# Patient Record
Sex: FEMALE | Race: WHITE | NOT HISPANIC OR LATINO | Employment: FULL TIME | URBAN - METROPOLITAN AREA
[De-identification: names, ages, dates, MRNs, and addresses within clinical notes are randomized per-mention and may not be internally consistent; named-entity substitution may affect disease eponyms.]

---

## 2017-01-13 ENCOUNTER — OFFICE VISIT (OUTPATIENT)
Dept: OBSTETRICS AND GYNECOLOGY | Facility: CLINIC | Age: 47
End: 2017-01-13

## 2017-01-13 VITALS
SYSTOLIC BLOOD PRESSURE: 120 MMHG | HEIGHT: 63 IN | BODY MASS INDEX: 41.46 KG/M2 | DIASTOLIC BLOOD PRESSURE: 80 MMHG | WEIGHT: 234 LBS

## 2017-01-13 DIAGNOSIS — Z01.419 ENCOUNTER FOR GYNECOLOGICAL EXAMINATION WITHOUT ABNORMAL FINDING: Primary | ICD-10-CM

## 2017-01-13 DIAGNOSIS — Z12.31 ENCOUNTER FOR SCREENING MAMMOGRAM FOR BREAST CANCER: ICD-10-CM

## 2017-01-13 DIAGNOSIS — Z12.4 PAP SMEAR FOR CERVICAL CANCER SCREENING: ICD-10-CM

## 2017-01-13 DIAGNOSIS — Z13.9 SCREENING: ICD-10-CM

## 2017-01-13 LAB
BILIRUB BLD-MCNC: NEGATIVE MG/DL
CLARITY, POC: CLEAR
COLOR UR: YELLOW
GLUCOSE UR STRIP-MCNC: NEGATIVE MG/DL
KETONES UR QL: NEGATIVE
LEUKOCYTE EST, POC: NEGATIVE
NITRITE UR-MCNC: NEGATIVE MG/ML
PH UR: 5 [PH] (ref 5–8)
PROT UR STRIP-MCNC: NEGATIVE MG/DL
RBC # UR STRIP: ABNORMAL /UL
SP GR UR: 1.03 (ref 1–1.03)
UROBILINOGEN UR QL: NORMAL

## 2017-01-13 PROCEDURE — 81002 URINALYSIS NONAUTO W/O SCOPE: CPT | Performed by: OBSTETRICS & GYNECOLOGY

## 2017-01-13 PROCEDURE — 99396 PREV VISIT EST AGE 40-64: CPT | Performed by: OBSTETRICS & GYNECOLOGY

## 2017-01-13 RX ORDER — DICLOFENAC SODIUM 75 MG/1
TABLET, DELAYED RELEASE ORAL
COMMUNITY
Start: 2016-10-29 | End: 2017-01-13

## 2017-01-13 NOTE — MR AVS SNAPSHOT
Teresita Billie   1/13/2017 9:00 AM   Office Visit    Dept Phone:  [phone]   Encounter #:  [id number]    Provider:  Katia Franco MD   Department:  [facility]                Your Full Care Plan              Today's Medication Changes          These changes are accurate as of: 1/13/17  9:43 AM.  If you have any questions, ask your nurse or doctor.               Stop taking medication(s)listed here:     diclofenac 75 MG EC tablet   Commonly known as:  VOLTAREN   Stopped by:  Katia Franco MD                      Your Updated Medication List          This list is accurate as of: 1/13/17  9:43 AM.  Always use your most recent med list.                buPROPion  MG 24 hr tablet   Commonly known as:  WELLBUTRIN XL       CENTRUM ADULTS PO       methocarbamol 500 MG tablet   Commonly known as:  ROBAXIN   2 po Q6h PRN pain       vitamin D 27150 UNITS capsule capsule   Commonly known as:  ERGOCALCIFEROL               We Performed the Following     IgP, Aptima HPV     POC Urinalysis Dipstick       You Were Diagnosed With        Codes Comments    Encounter for gynecological examination without abnormal finding    -  Primary ICD-10-CM: Z01.419  ICD-9-CM: V72.31     Encounter for screening mammogram for breast cancer     ICD-10-CM: Z12.31  ICD-9-CM: V76.12     Screening     ICD-10-CM: Z13.9  ICD-9-CM: V82.9     Pap smear for cervical cancer screening     ICD-10-CM: Z12.4  ICD-9-CM: V76.2       Instructions     None    Patient Instructions History      Upcoming Appointments     Visit Type Date Time Department    OFFICE VISIT 1/13/2017  9:00 AM BRAYDEN DUFFY      RECCY[facility]aron Signup     [facility] [employer] allows you to send messages to your doctor, view your test results, renew your prescriptions, schedule appointments, and more. To sign up, go to [employer] and click on the Sign Up Now link in the New User? box. Enter your  "[employer] Activation Code exactly as it appears below along with the last four digits of your Social Security Number and your Date of Birth () to complete the sign-up process. If you do not sign up before the expiration date, you must request a new code.    [employer] Activation Code: [email or web address]  Expires: 2017 10:39 AM    If you have questions, you can email [email or web address] or call [phone] to talk to our [employer] staff. Remember, [employer] is NOT to be used for urgent needs. For medical emergencies, dial 911.               Other Info from Your Visit           Allergies     Penicillins  Hives      Reason for Visit     Gynecologic Exam Last sceening mammo 11/3/15      Vital Signs     Blood Pressure Height Weight Body Mass Index Smoking Status       120/80 63\" (160 cm) 234 lb (106 kg) 41.45 kg/m2 Never Smoker       Problems and Diagnoses Noted     Encounter for routine gynecological examination    -  Primary    Encounter for screening mammogram for breast cancer        Screening        Pap smear for cervical cancer screening          Results     POC Urinalysis Dipstick      Component Value Standard Range & Units    Color Yellow Yellow, Straw, Dark Yellow, Zeina    Clarity, UA Clear Clear    Glucose, UA Negative Negative, 1000 mg/dL (3+) mg/dL    Bilirubin Negative Negative    Ketones, UA Negative Negative    Specific Gravity  1.030 1.005 - 1.030    Blood, UA Trace Negative    pH, Urine 5.0 5.0 - 8.0    Protein, POC Negative Negative mg/dL    Urobilinogen, UA Normal Normal    Leukocytes Negative Negative    Nitrite, UA Negative Negative                    "

## 2017-01-13 NOTE — PROGRESS NOTES
"[facility]    GYN ANNUAL EXAM:  Chief Complaint   Patient presents with   • Gynecologic Exam     Last sceening mammo 11/3/15       Subjective   History of Present Illness    Teresita Wise is a 47 y.o. female who presents for annual exam.  Teresita is having some shoulder pain.  She had laparoscopic shoulder surgery in September.  She also had a left breast ultrasound for a palpable lump in September which was negative.  The lump is no longer present.  No gynecologic issues.    Obstetric History:  OB History      Para Term  AB TAB SAB Ectopic Multiple Living    4 3 3  1  1   3         Menstrual History:  Menarche age: 12 years  No LMP recorded. Patient has had a hysterectomy.  Period Cycle (Days): 28  Period Duration (Days): 6  Period Pattern: Regular  Menstrual Flow: Heavy  Menstrual Control: Maxi pad    Sexual History: active         Family history of breast cancer: no  Family history of ovarian cancer: no  Family history of uterine cancer: no  Family History of cervical cancer: no  Family History of colon cancer/colon polyps: no  Regular self breast exam: yes  Current contraception:S/P HYSTERECTOMY  History of abnormal Pap smear: no  Received Gardasil immunization: N/A  DEISI exposure in utero: no  History of abnormal mammogram: no    The following portions of the patient's history were reviewed and updated as appropriate: allergies, current medications, past family history, past medical history, past social history, past surgical history and problem list.    Review of Systems    Pertinent items are noted in HPI.       Objective   Physical Exam    Visit Vitals   • /80   • Ht 63\" (160 cm)   • Wt 234 lb (106 kg)   • BMI 41.45 kg/m2       General:   alert, appears stated age and cooperative   Heart: regular rate and rhythm, S1, S2 normal, no murmur, click, rub or gallop   Lungs: clear to auscultation bilaterally   Abdomen: soft, non-tender, without masses or " organomegaly   Breast: inspection negative, no nipple discharge or bleeding, no masses or nodularity palpable   Vulva: normal   Vagina: normal mucosa, normal discharge   Cervix: absent   Uterus: absent   Adnexa: no mass, fullness, tenderness     Assessment/Plan   Teresita was seen today for gynecologic exam.    Diagnoses and all orders for this visit:    Encounter for gynecological examination without abnormal finding    Encounter for screening mammogram for breast cancer  -     Mammo Screening Bilateral With CAD; Future    Pap smear for cervical cancer screening  -     IgP, Aptima HPV    Screening  -     POC Urinalysis Dipstick        Thin prep Pap smear.  Mammogram.  Contraception: S/P HYSTERECTOMY.  All questions answered.  Await pap smear results.  Follow up in 1 year.               Katia Franco MD,  FACOG

## 2017-01-17 LAB
CYTOLOGIST CVX/VAG CYTO: NORMAL
CYTOLOGY CVX/VAG DOC THIN PREP: NORMAL
DX ICD CODE: NORMAL
HIV 1 & 2 AB SER-IMP: NORMAL
HPV I/H RISK 4 DNA CVX QL PROBE+SIG AMP: NEGATIVE
OTHER STN SPEC: NORMAL
PATH REPORT.FINAL DX SPEC: NORMAL
STAT OF ADQ CVX/VAG CYTO-IMP: NORMAL

## 2017-01-19 ENCOUNTER — TELEPHONE (OUTPATIENT)
Dept: OBSTETRICS AND GYNECOLOGY | Facility: CLINIC | Age: 47
End: 2017-01-19

## 2017-01-19 NOTE — TELEPHONE ENCOUNTER
----- Message from Katia Franco MD sent at 1/18/2017  2:07 PM EST -----  Congratulations your pap testing is normal      Thank you  Katia Franco MD

## 2017-05-09 ENCOUNTER — TELEPHONE (OUTPATIENT)
Dept: OBSTETRICS AND GYNECOLOGY | Facility: CLINIC | Age: 47
End: 2017-05-09

## 2017-08-01 ENCOUNTER — OFFICE VISIT (OUTPATIENT)
Dept: OBSTETRICS AND GYNECOLOGY | Facility: CLINIC | Age: 47
End: 2017-08-01

## 2017-08-01 VITALS
HEART RATE: 83 BPM | HEIGHT: 63 IN | BODY MASS INDEX: 38.62 KG/M2 | SYSTOLIC BLOOD PRESSURE: 129 MMHG | DIASTOLIC BLOOD PRESSURE: 78 MMHG | WEIGHT: 218 LBS

## 2017-08-01 DIAGNOSIS — R61 HYPERHIDROSIS: Primary | ICD-10-CM

## 2017-08-01 PROCEDURE — 99212 OFFICE O/P EST SF 10 MIN: CPT | Performed by: OBSTETRICS & GYNECOLOGY

## 2017-08-01 RX ORDER — TRAMADOL HYDROCHLORIDE 50 MG/1
TABLET ORAL
COMMUNITY
Start: 2017-07-12 | End: 2017-09-17

## 2017-08-01 RX ORDER — HYDROCODONE BITARTRATE AND ACETAMINOPHEN 5; 325 MG/1; MG/1
TABLET ORAL
COMMUNITY
Start: 2017-07-08 | End: 2017-09-17

## 2017-08-01 RX ORDER — CYCLOBENZAPRINE HCL 5 MG
TABLET ORAL
COMMUNITY
Start: 2017-05-21 | End: 2018-03-14

## 2017-08-01 RX ORDER — METOPROLOL SUCCINATE 50 MG/1
TABLET, EXTENDED RELEASE ORAL
COMMUNITY
Start: 2017-07-05 | End: 2022-10-14

## 2017-08-01 NOTE — PROGRESS NOTES
Subjective   Teresita Wise is a 47 y.o. female   CC: Pt here for sweating.   History of Present Illness  Pt here for sweating.  Patient reports a fullness been ongoing for several years.  She reports about the last 2 years having frequent spells of sweating that*mostly in her face and then progressed and was to her chest.  She reports it is very embarrassing for her and causes her a lot of stress.  She states that when she has these spells she does not typically feel hot.  She denies a symptoms of flushing.  She reports only the sweating.  In fact, her  mentioned that she can be wearing heating pad because she is cold and begin to get sweating spells.  She denies any increase in occurrence of these events during the night.  Patient recently saw her primary care physician for this who performed some laboratory testing.  Patient's FSH level was normal at about 5.  Her estradiol level was also normal 164.  Patient is status post hysterectomy about 15 years ago secondary to persistent vaginal bleeding despite endometrial ablation.  Her ovaries were retained.    OB History    Para Term  AB SAB TAB Ectopic Multiple Living   4 3 3  1 1    3      # Outcome Date GA Lbr Andres/2nd Weight Sex Delivery Anes PTL Lv   4 Term 99 40w0d  8 lb 7 oz (3.827 kg) F CS-LTranv Spinal  Y      Birth Comments: Tubal ligation done   3 SAB 1998      Spinal        Complications: Blighted ovum   2 Term 96 38w0d  7.5 oz (0.213 kg) M CS-LTranv Spinal  Y   1 Term 94 40w0d  8 lb 9 oz (3.884 kg) F CS-LTranv Spinal  Y      Complications: Placental abruption        Past Medical History:   Diagnosis Date   • Clotting disorder    • Depression    • DVT (deep venous thrombosis)     postop   • GERD (gastroesophageal reflux disease)    • H/O malignant hyperthermia     Unverified, occurred in childhood, records sought at [facility] but no longer available   • History of panic attacks    •  Menometrorrhagia     Endometrial ablation followed by hysterectomy   • Urinary tract infection      Past Surgical History:   Procedure Laterality Date   • ANKLE ARTHROSCOPY Left     Postoperative DVT, left calf   •  SECTION     •  SECTION PRIMARY      Emergency  for placental abruption   •  SECTION WITH TUBAL  1998   • CHOLECYSTECTOMY     • D&C WITH SUCTION  1996    8 weeks Postpartum subinvolution, Dr. Yue Ruth, [facility]   • D&C WITH SUCTION  1998    Blighted ovum, Dr. Yue Ruth, [facility]   • DILATATION AND CURETTAGE  1994    Dr. Yue Ruth, chronic postpartum bleeding on Depo-Provera   • HEMORRHOIDECTOMY  1998    Thrombosed hemorrhoids   • HYSTEROSCOPY ENDOMETRIAL ABLATION  2000    [place], Dr. Franco, [facility]   • SHOULDER ARTHROSCOPY  2015   • TOTAL ABDOMINAL HYSTERECTOMY      Menometrorrhagia, failed ablation Dr Franco, at [facility]     Family History   Problem Relation Age of Onset   • Adopted: Yes   • No Known Problems Father    • No Known Problems Mother    • No Known Problems Brother    • No Known Problems Sister    • No Known Problems Paternal Grandfather    • No Known Problems Paternal Grandmother    • No Known Problems Maternal Grandmother    • No Known Problems Maternal Grandfather    • No Known Problems Son      Social History     Social History   • Marital status:      Spouse name: N/A   • Number of children: N/A   • Years of education: N/A     Social History Main Topics   • Smoking status: Never Smoker   • Smokeless tobacco: Never Used   • Alcohol use No   • Drug use: No   • Sexual activity: Yes     Partners: Male     Other Topics Concern   • None     Social History Narrative       Current Outpatient Prescriptions:   •  buPROPion XL (WELLBUTRIN XL) 150 MG 24 hr tablet, , Disp: , Rfl:   •  cyclobenzaprine (FLEXERIL) 5 MG tablet, ,  "Disp: , Rfl:   •  HYDROcodone-acetaminophen (NORCO) 5-325 MG per tablet, , Disp: , Rfl:   •  metoprolol succinate XL (TOPROL-XL) 50 MG 24 hr tablet, , Disp: , Rfl:   •  Multiple Vitamins-Minerals (CENTRUM ADULTS PO), Take  by mouth., Disp: , Rfl:   •  traMADol (ULTRAM) 50 MG tablet, , Disp: , Rfl:   •  vitamin D (ERGOCALCIFEROL) 42443 UNITS capsule capsule, , Disp: , Rfl:      Allergies   Allergen Reactions   • Penicillins Hives       Review of Systems  General: No fever or chills  Constitutional: No weight loss or gain, no hair loss  HENT: No headache, no hearing loss, no tinnitus  Eyes: normal vision, no eye pain  Skin: No rashes  Lymph: No swelling  Neuro: No parathesia, no weakness  Psych: Normal though content, no hallucinations, no SI/HI    Objective   Physical Exam  Vitals:    08/01/17 0938   BP: 129/78   Pulse: 83   Weight: 218 lb (98.9 kg)   Height: 63\" (160 cm)   General: No acute distress, awake and oriented ×3  Extremities: No Tenderness, no Homans sign, no lower extremity edema  Psychiatric: Normal judgment and insight, normal affect and mood  Neurologic: Cranial nerves II through XII intact, no gross deficits    Assessment/Plan   Diagnoses and all orders for this visit:    Hyperhidrosis    Overall, patient's symptoms do not sound like they're related to kathia or post menopause.  She has normal FSH and estradiol levels which would go against menopausal hot flashes.  Additionally, it sounds that she only has symptoms of sweating, and not hot flashes or flushing.  We discussed the typical treatment modalities for post and perimenopausal hot flushing including lifestyle changes, soy replacement, venlafaxine, and estrogen replacement therapy.  I do not feel that medical therapy would be indicated for menopausal hot flashes given her symptomatology and normal hormonal levels.  Additionally, the patient has a history of deep venous thrombosis, and I would never recommend estrogen replacement therapy in this " patient because of these risks.  We discussed the potential risks of compounded estrogen replacement therapy as well.  Overall, I feel that she should consider evaluation with possibly dermatology or neurology for evaluation and treatment of what sounds like hyperhidrosis.  Patient should likely start by returning to her primary care physician to consider referral to these other services.  I do not feel that I have anything that I can offer this patient for her hyperhidrosis at this time other than lifestyle changes.  She verbalizes understanding.  She can see me back as needed in the future.

## 2017-09-29 ENCOUNTER — TELEPHONE (OUTPATIENT)
Dept: OBSTETRICS AND GYNECOLOGY | Facility: CLINIC | Age: 47
End: 2017-09-29

## 2017-09-29 NOTE — TELEPHONE ENCOUNTER
----- Message from Jin Saleem MD sent at 9/28/2017  9:16 PM EDT -----      ----- Message -----     From: Vicki L Severs, RegSch Rep     Sent: 9/28/2017   3:40 PM       To: Jin Saleem MD    [facility] 9/28/17

## 2017-10-02 ENCOUNTER — TELEPHONE (OUTPATIENT)
Dept: OBSTETRICS AND GYNECOLOGY | Facility: CLINIC | Age: 47
End: 2017-10-02

## 2017-10-02 NOTE — TELEPHONE ENCOUNTER
----- Message from Jin Saleem MD sent at 9/28/2017  9:16 PM EDT -----      ----- Message -----     From: Vicki L Severs, RegSch Rep     Sent: 9/28/2017   3:40 PM       To: Jin Saleem MD    [facility] 9/28/17

## 2018-04-18 ENCOUNTER — OFFICE VISIT (OUTPATIENT)
Dept: OBSTETRICS AND GYNECOLOGY | Age: 48
End: 2018-04-18

## 2018-04-18 VITALS
BODY MASS INDEX: 36.19 KG/M2 | HEIGHT: 64 IN | WEIGHT: 212 LBS | SYSTOLIC BLOOD PRESSURE: 110 MMHG | DIASTOLIC BLOOD PRESSURE: 70 MMHG

## 2018-04-18 DIAGNOSIS — Z01.419 WELL WOMAN EXAM WITH ROUTINE GYNECOLOGICAL EXAM: ICD-10-CM

## 2018-04-18 DIAGNOSIS — R30.9 PAINFUL URINATION: Primary | ICD-10-CM

## 2018-04-18 LAB
BILIRUB BLD-MCNC: NEGATIVE MG/DL
CLARITY, POC: CLEAR
COLOR UR: YELLOW
GLUCOSE UR STRIP-MCNC: NEGATIVE MG/DL
KETONES UR QL: NORMAL
LEUKOCYTE EST, POC: NEGATIVE
NITRITE UR-MCNC: NEGATIVE MG/ML
PH UR: 5 [PH] (ref 5–8)
PROT UR STRIP-MCNC: NORMAL MG/DL
RBC # UR STRIP: NEGATIVE /UL
SP GR UR: 1.03 (ref 1–1.03)
UROBILINOGEN UR QL: NORMAL

## 2018-04-18 PROCEDURE — 99396 PREV VISIT EST AGE 40-64: CPT | Performed by: NURSE PRACTITIONER

## 2018-04-18 PROCEDURE — 81002 URINALYSIS NONAUTO W/O SCOPE: CPT | Performed by: NURSE PRACTITIONER

## 2018-04-18 NOTE — PROGRESS NOTES
[facility]  Routine Annual Visit    2018    Patient: Teresita Wise          MR#:[id number]      History of Present Illness    48 y.o. female  who presents for annual exam. Last annual with Dr. Franco 2017. Pap negative, HPV negative. Teresita reports last mammogram normal 2016- results requested.  No GYN complaints today. Seeing dermatology for hyperhidrosis.      No LMP recorded (lmp unknown). Patient has had a hysterectomy.  Obstetric History:  OB History      Para Term  AB Living    4 3 3  1 3    SAB TAB Ectopic Molar Multiple Live Births    1     3         Menstrual History:     No LMP recorded (lmp unknown). Patient has had a hysterectomy.       Sexual History:       ________________________________________  Patient Active Problem List   Diagnosis   • Vaginal itching   • Hyperhidrosis       Past Medical History:   Diagnosis Date   • Clotting disorder    • Depression    • DVT (deep venous thrombosis)     postop   • GERD (gastroesophageal reflux disease)    • H/O malignant hyperthermia     Unverified, occurred in childhood, records sought at [facility] but no longer available   • History of panic attacks    • Menometrorrhagia     Endometrial ablation followed by hysterectomy   • Urinary tract infection        Past Surgical History:   Procedure Laterality Date   • ANKLE ARTHROSCOPY Left     Postoperative DVT, left calf   •  SECTION     •  SECTION PRIMARY      Emergency  for placental abruption   •  SECTION WITH TUBAL  1998   • CHOLECYSTECTOMY     • D&C WITH SUCTION  1996    8 weeks Postpartum subinvolution, Dr. Yue Ruth, [facility]   • D&C WITH SUCTION  1998    Blighted ovum, Dr. Yue Ruth, [facility]   • DILATATION AND CURETTAGE  1994    Dr. Yue Ruth, chronic postpartum bleeding on Depo-Provera   • HEMORRHOIDECTOMY  1998     Thrombosed hemorrhoids   • HYSTEROSCOPY ENDOMETRIAL ABLATION  09/2000    Dr. Joan Haley, [facility]   • SHOULDER ARTHROSCOPY  09/2015   • TOTAL ABDOMINAL HYSTERECTOMY  2002    Menometrorrhagia, failed ablation Dr Franco, at [facility]       History   Smoking Status   • Never Smoker   Smokeless Tobacco   • Never Used       Family History   Problem Relation Age of Onset   • Adopted: Yes   • No Known Problems Father    • No Known Problems Mother    • No Known Problems Brother    • No Known Problems Sister    • No Known Problems Paternal Grandfather    • No Known Problems Paternal Grandmother    • No Known Problems Maternal Grandmother    • No Known Problems Maternal Grandfather    • No Known Problems Son    • Breast cancer Neg Hx    • Ovarian cancer Neg Hx    • Uterine cancer Neg Hx    • Colon cancer Neg Hx    • Melanoma Neg Hx        Prior to Admission medications    Medication Sig Start Date End Date Taking? Authorizing Provider   Glycopyrrolate (ROBINUL PO) Take  by mouth.   Yes Nurse [facility], RN   metoprolol succinate XL (TOPROL-XL) 50 MG 24 hr tablet  7/5/17  Yes Historical Provider, MD   Multiple Vitamins-Minerals (CENTRUM ADULTS PO) Take  by mouth.   Yes Nurse [facility], RN   vitamin D (ERGOCALCIFEROL) 40883 UNITS capsule capsule  9/8/16  Yes Historical Provider, MD   predniSONE (DELTASONE) 20 MG tablet 3 po qd x 3d, then 2 po qd x 3d, then 1 po qd x 3d 3/14/18 4/18/18  Mary Stanley, ULISES     ________________________________________    The following portions of the patient's history were reviewed and updated as appropriate: allergies, current medications, past family history, past medical history, past social history, past surgical history and problem list.    Review of Systems   Constitutional: Negative.    HENT: Negative.    Eyes: Negative for visual disturbance.   Respiratory: Negative for cough, shortness of breath and wheezing.    Cardiovascular:  "Negative for chest pain, palpitations and leg swelling.   Gastrointestinal: Negative for abdominal distention, abdominal pain, blood in stool, constipation, diarrhea, nausea and vomiting.   Endocrine: Negative for cold intolerance and heat intolerance.   Genitourinary: Negative for difficulty urinating, dyspareunia, dysuria, frequency, genital sores, hematuria, menstrual problem, pelvic pain, urgency, vaginal bleeding, vaginal discharge and vaginal pain.   Musculoskeletal: Negative.    Skin: Negative.    Neurological: Negative for dizziness, weakness, light-headedness, numbness and headaches.   Hematological: Negative.    Psychiatric/Behavioral: Negative.    Breasts: negative for lumps skin changes, dimpling, swelling, nipple changes/discharge bilaterally         Objective   Physical Exam    /70   Ht 162.6 cm (64\")   Wt 96.2 kg (212 lb)   LMP  (LMP Unknown)   BMI 36.39 kg/m²    BP Readings from Last 3 Encounters:   04/18/18 110/70   03/14/18 151/87   01/20/18 112/56      Wt Readings from Last 3 Encounters:   04/18/18 96.2 kg (212 lb)   03/14/18 90.7 kg (200 lb)   01/20/18 93.4 kg (206 lb)        BMI: Estimated body mass index is 36.39 kg/m² as calculated from the following:    Height as of this encounter: 162.6 cm (64\").    Weight as of this encounter: 96.2 kg (212 lb).        General:   alert, appears stated age and cooperative   Heart: regular rate and rhythm, S1, S2 normal, no murmur, click, rub or gallop   Lungs: clear to auscultation bilaterally   Abdomen: soft, non-tender, without masses or organomegaly   Breast: inspection negative, no nipple discharge or bleeding, no masses or nodularity palpable   Vulva: normal   Vagina: normal mucosa, normal discharge   Cervix: absent   Uterus: absent    Adnexa: exam limited by habitus     Assessment:    1. Normal annual exam   2. Healthy lifestyle modifications discussed, counseled on self breast exams and bone health      Plan:    []  Rx:   []  Mammogram " request made  [x]  PAP done  []  Occult fecal blood test (Insure)  []  Labs:   []  GC/Chl/TV  []  DEXA scan   []  Referral for colonoscopy:           ULISES Evangelista  4/18/2018 2:39 PM

## 2018-04-24 ENCOUNTER — TELEPHONE (OUTPATIENT)
Dept: OBSTETRICS AND GYNECOLOGY | Age: 48
End: 2018-04-24

## 2018-04-24 LAB
CONV .: NORMAL
CYTOLOGIST CVX/VAG CYTO: NORMAL
CYTOLOGY CVX/VAG DOC THIN PREP: NORMAL
DX ICD CODE: NORMAL
HIV 1 & 2 AB SER-IMP: NORMAL
Lab: NORMAL
OTHER STN SPEC: NORMAL
PATH REPORT.FINAL DX SPEC: NORMAL
STAT OF ADQ CVX/VAG CYTO-IMP: NORMAL

## 2018-04-24 NOTE — TELEPHONE ENCOUNTER
----- Message from ULISES Mesa sent at 4/24/2018  2:37 PM EDT -----  Please inform patient her pap smear returned negative (normal). Thank you.

## 2018-10-02 ENCOUNTER — TELEPHONE (OUTPATIENT)
Dept: OBSTETRICS AND GYNECOLOGY | Age: 48
End: 2018-10-02

## 2018-10-02 NOTE — TELEPHONE ENCOUNTER
----- Message from Jin Saleem MD sent at 10/2/2018  8:10 AM EDT -----  Call patient: Normal mammogram

## 2022-03-10 ENCOUNTER — TRANSCRIBE ORDERS (OUTPATIENT)
Dept: ADMINISTRATIVE | Facility: HOSPITAL | Age: 52
End: 2022-03-10

## 2022-03-10 ENCOUNTER — OFFICE VISIT (OUTPATIENT)
Dept: GASTROENTEROLOGY | Facility: CLINIC | Age: 52
End: 2022-03-10

## 2022-03-10 ENCOUNTER — TELEPHONE (OUTPATIENT)
Dept: GASTROENTEROLOGY | Facility: CLINIC | Age: 52
End: 2022-03-10

## 2022-03-10 VITALS — BODY MASS INDEX: 30.48 KG/M2 | HEIGHT: 63 IN | WEIGHT: 172 LBS | TEMPERATURE: 96.7 F

## 2022-03-10 DIAGNOSIS — R12 HEARTBURN: ICD-10-CM

## 2022-03-10 DIAGNOSIS — Z87.11 HISTORY OF PEPTIC ULCER DISEASE: ICD-10-CM

## 2022-03-10 DIAGNOSIS — R10.13 EPIGASTRIC PAIN: Primary | ICD-10-CM

## 2022-03-10 DIAGNOSIS — Z01.818 OTHER SPECIFIED PRE-OPERATIVE EXAMINATION: Primary | ICD-10-CM

## 2022-03-10 PROCEDURE — 99214 OFFICE O/P EST MOD 30 MIN: CPT | Performed by: NURSE PRACTITIONER

## 2022-03-10 RX ORDER — MELOXICAM 15 MG/1
TABLET ORAL
COMMUNITY
Start: 2022-01-18 | End: 2022-10-14

## 2022-03-10 RX ORDER — ESTRADIOL 0.1 MG/G
1 CREAM VAGINAL
COMMUNITY
Start: 2021-11-19 | End: 2022-11-19

## 2022-03-10 RX ORDER — SUCRALFATE ORAL 1 G/10ML
1 SUSPENSION ORAL 4 TIMES DAILY
COMMUNITY
Start: 2022-02-28 | End: 2022-03-14

## 2022-03-10 RX ORDER — ASCORBIC ACID 250 MG
250 TABLET ORAL DAILY
COMMUNITY

## 2022-03-10 RX ORDER — OMEPRAZOLE 40 MG/1
40 CAPSULE, DELAYED RELEASE ORAL
COMMUNITY
Start: 2022-02-28 | End: 2022-04-25 | Stop reason: HOSPADM

## 2022-03-10 RX ORDER — METHOCARBAMOL 750 MG/1
TABLET, FILM COATED ORAL
COMMUNITY
Start: 2022-01-18 | End: 2022-10-14

## 2022-03-10 RX ORDER — SUMATRIPTAN 100 MG/1
100 TABLET, FILM COATED ORAL
COMMUNITY
Start: 2022-01-18

## 2022-03-10 RX ORDER — LANOLIN ALCOHOL/MO/W.PET/CERES
CREAM (GRAM) TOPICAL DAILY
COMMUNITY

## 2022-03-10 NOTE — TELEPHONE ENCOUNTER
spoke with pt scheduled at [facility] on april 25 arrive at 0920 am stephanie jamil---handed pt egd instructional packet

## 2022-03-10 NOTE — PROGRESS NOTES
Chief Complaint   Patient presents with   • Abdominal Pain   • Heartburn       HPI    Teresita Wise is a  52 y.o. female here to establish care as a new patient for complaints of abdominal pain.    This patient will also follow with Dr. Rivera.    Patient reports approximately 1 month of epigastric pain described as an aching sensation that comes and goes with associated heartburn.  She saw her primary care provider for this issue and was started on omeprazole in combination of twice daily dosing Carafate.  Some relief with medication initiation but not resolution.  She has been on these medications for approximately 2 weeks.  Denies over-the-counter NSAID use.  Does occasionally take Mobic for joint pain.    Denies diarrhea, constipation, rectal bleeding.    Negative Cologuard per patient last year.  Normal colonoscopy ?  5 years ago.  Family history unknown as she is adopted.     Recent lab work in care everywhere ([facility]) with normal hemogram and normal LFTs.    Past Medical History:   Diagnosis Date   • Clotting disorder ([facility])    • Depression    • DVT (deep venous thrombosis) ([facility])     postop   • GERD (gastroesophageal reflux disease)    • H/O malignant hyperthermia     Unverified, occurred in childhood, records sought at [facility] but no longer available   • History of panic attacks    • Menometrorrhagia     Endometrial ablation followed by hysterectomy   • Urinary tract infection        Past Surgical History:   Procedure Laterality Date   • ANKLE ARTHROSCOPY Left     Postoperative DVT, left calf   •  SECTION     •  SECTION PRIMARY      Emergency  for placental abruption   •  SECTION WITH TUBAL  1998   • CHOLECYSTECTOMY     • D & C WITH SUCTION  1996    8 weeks Postpartum subinvolution, Dr. Yue Ruth, [facility]   • D & C WITH SUCTION  1998    Blighted ovum, Dr. Yue Ruth, [facility]   •  DILATATION AND CURETTAGE  04/19/1994    Dr. Yue Ruth, chronic postpartum bleeding on Depo-Provera   • HEMORRHOIDECTOMY  12/11/1998    Thrombosed hemorrhoids   • HYSTEROSCOPY ENDOMETRIAL ABLATION  09/2000    Dr. Joan Haley, [facility]   • SHOULDER ARTHROSCOPY  09/2015   • TOTAL ABDOMINAL HYSTERECTOMY  2002    Menometrorrhagia, failed ablation Dr Franco, at [facility]       Scheduled Meds:     Continuous Infusions: No current facility-administered medications for this visit.      PRN Meds:     Allergies   Allergen Reactions   • Penicillins Hives       Social History     Socioeconomic History   • Marital status:    Tobacco Use   • Smoking status: Never Smoker   • Smokeless tobacco: Never Used   Substance and Sexual Activity   • Alcohol use: No   • Drug use: No   • Sexual activity: Yes     Partners: Male     Birth control/protection: None       Family History   Adopted: Yes   Problem Relation Age of Onset   • No Known Problems Father    • No Known Problems Mother    • No Known Problems Brother    • No Known Problems Sister    • No Known Problems Paternal Grandfather    • No Known Problems Paternal Grandmother    • No Known Problems Maternal Grandmother    • No Known Problems Maternal Grandfather    • No Known Problems Son    • Breast cancer Neg Hx    • Ovarian cancer Neg Hx    • Uterine cancer Neg Hx    • Colon cancer Neg Hx    • Melanoma Neg Hx        Review of Systems   Constitutional: Negative for activity change, appetite change, fatigue, fever and unexpected weight change.   HENT: Negative for trouble swallowing.    Respiratory: Negative for apnea, cough, choking, chest tightness, shortness of breath and wheezing.    Cardiovascular: Negative for chest pain, palpitations and leg swelling.   Gastrointestinal: Positive for abdominal pain. Negative for abdominal distention, anal bleeding, blood in stool, constipation, diarrhea, nausea, rectal pain and vomiting.        Vitals:    03/10/22 0830   Temp: 96.7 °F (35.9 °C)       Physical Exam  Constitutional:       Appearance: She is well-developed.   Abdominal:      General: Bowel sounds are normal. There is no distension.      Palpations: Abdomen is soft. There is no mass.      Tenderness: There is no abdominal tenderness. There is no guarding.      Hernia: No hernia is present.   Skin:     General: Skin is warm and dry.      Capillary Refill: Capillary refill takes less than 2 seconds.   Neurological:      Mental Status: She is alert and oriented to person, place, and time.   Psychiatric:         Behavior: Behavior normal.     Assessment    Diagnoses and all orders for this visit:    1. Epigastric pain (Primary)  -     Case Request; Standing  -     Case Request    2. Heartburn  -     Case Request; Standing  -     Case Request    3. History of peptic ulcer disease  -     Case Request; Standing  -     Case Request       Plan    Miguelina 52-year-old female seen today in follow-up for about 1 month of epigastric pain and heartburn with a history of peptic ulcer disease.    Recommend EGD with Dr. Rivera to further evaluate symptoms rule out recurrence of peptic ulcer, H. pylori infection, hiatal hernia, celiac disease, Nicole's esophagus, etc. Risk/benefits of procedure reviewed with the patient all questions were answered.    Continue omeprazole and Carafate.    Avoid NSAIDs.    Follow an antireflux diet.    Further recommendations and follow-up pending aforementioned work-up.           ULISES Morales  [facility]  [address] [place] [address]  Office: [phone]

## 2022-04-14 ENCOUNTER — TELEPHONE (OUTPATIENT)
Dept: GASTROENTEROLOGY | Facility: CLINIC | Age: 52
End: 2022-04-14

## 2022-04-14 DIAGNOSIS — Z01.818 PRE-OP TESTING: Primary | ICD-10-CM

## 2022-04-22 ENCOUNTER — LAB (OUTPATIENT)
Dept: LAB | Facility: HOSPITAL | Age: 52
End: 2022-04-22

## 2022-04-22 LAB — SARS-COV-2 ORF1AB RESP QL NAA+PROBE: NOT DETECTED

## 2022-04-22 PROCEDURE — U0004 COV-19 TEST NON-CDC HGH THRU: HCPCS | Performed by: INTERNAL MEDICINE

## 2022-04-22 PROCEDURE — C9803 HOPD COVID-19 SPEC COLLECT: HCPCS | Performed by: INTERNAL MEDICINE

## 2022-04-25 ENCOUNTER — HOSPITAL ENCOUNTER (OUTPATIENT)
Facility: HOSPITAL | Age: 52
Setting detail: HOSPITAL OUTPATIENT SURGERY
Discharge: HOME | End: 2022-04-25
Attending: INTERNAL MEDICINE | Admitting: INTERNAL MEDICINE

## 2022-04-25 ENCOUNTER — ANESTHESIA EVENT (OUTPATIENT)
Dept: GASTROENTEROLOGY | Facility: HOSPITAL | Age: 52
End: 2022-04-25

## 2022-04-25 ENCOUNTER — ANESTHESIA (OUTPATIENT)
Dept: GASTROENTEROLOGY | Facility: HOSPITAL | Age: 52
End: 2022-04-25

## 2022-04-25 VITALS
SYSTOLIC BLOOD PRESSURE: 101 MMHG | HEART RATE: 78 BPM | BODY MASS INDEX: 30.05 KG/M2 | HEIGHT: 64 IN | DIASTOLIC BLOOD PRESSURE: 58 MMHG | RESPIRATION RATE: 16 BRPM | WEIGHT: 176 LBS | OXYGEN SATURATION: 98 %

## 2022-04-25 DIAGNOSIS — Z87.11 HISTORY OF PEPTIC ULCER DISEASE: ICD-10-CM

## 2022-04-25 DIAGNOSIS — R10.13 EPIGASTRIC PAIN: ICD-10-CM

## 2022-04-25 DIAGNOSIS — R12 HEARTBURN: ICD-10-CM

## 2022-04-25 PROCEDURE — 25010000002 PROPOFOL 10 MG/ML EMULSION: Performed by: ANESTHESIOLOGY

## 2022-04-25 PROCEDURE — 43239 EGD BIOPSY SINGLE/MULTIPLE: CPT | Performed by: INTERNAL MEDICINE

## 2022-04-25 PROCEDURE — 88305 TISSUE EXAM BY PATHOLOGIST: CPT | Performed by: INTERNAL MEDICINE

## 2022-04-25 PROCEDURE — S0260 H&P FOR SURGERY: HCPCS | Performed by: INTERNAL MEDICINE

## 2022-04-25 RX ORDER — DIPHENHYDRAMINE HYDROCHLORIDE 50 MG/ML
6.25 INJECTION INTRAMUSCULAR; INTRAVENOUS
Status: DISCONTINUED | OUTPATIENT
Start: 2022-04-25 | End: 2022-04-25 | Stop reason: HOSPADM

## 2022-04-25 RX ORDER — EPHEDRINE SULFATE 50 MG/ML
5 INJECTION, SOLUTION INTRAVENOUS ONCE AS NEEDED
Status: DISCONTINUED | OUTPATIENT
Start: 2022-04-25 | End: 2022-04-25 | Stop reason: HOSPADM

## 2022-04-25 RX ORDER — ONDANSETRON 2 MG/ML
4 INJECTION INTRAMUSCULAR; INTRAVENOUS ONCE AS NEEDED
Status: DISCONTINUED | OUTPATIENT
Start: 2022-04-25 | End: 2022-04-25 | Stop reason: HOSPADM

## 2022-04-25 RX ORDER — HYDRALAZINE HYDROCHLORIDE 20 MG/ML
10 INJECTION INTRAMUSCULAR; INTRAVENOUS
Status: DISCONTINUED | OUTPATIENT
Start: 2022-04-25 | End: 2022-04-25 | Stop reason: HOSPADM

## 2022-04-25 RX ORDER — FENTANYL CITRATE 50 UG/ML
25 INJECTION, SOLUTION INTRAMUSCULAR; INTRAVENOUS
Status: DISCONTINUED | OUTPATIENT
Start: 2022-04-25 | End: 2022-04-25 | Stop reason: HOSPADM

## 2022-04-25 RX ORDER — PANTOPRAZOLE SODIUM 40 MG/1
40 TABLET, DELAYED RELEASE ORAL DAILY
Qty: 90 TABLET | Refills: 3 | OUTPATIENT
Start: 2022-04-25 | End: 2022-10-14

## 2022-04-25 RX ORDER — NALOXONE HCL 0.4 MG/ML
0.4 VIAL (ML) INJECTION AS NEEDED
Status: DISCONTINUED | OUTPATIENT
Start: 2022-04-25 | End: 2022-04-25 | Stop reason: HOSPADM

## 2022-04-25 RX ORDER — LABETALOL HYDROCHLORIDE 5 MG/ML
5 INJECTION, SOLUTION INTRAVENOUS
Status: DISCONTINUED | OUTPATIENT
Start: 2022-04-25 | End: 2022-04-25 | Stop reason: HOSPADM

## 2022-04-25 RX ORDER — LIDOCAINE HYDROCHLORIDE 20 MG/ML
INJECTION, SOLUTION INFILTRATION; PERINEURAL AS NEEDED
Status: DISCONTINUED | OUTPATIENT
Start: 2022-04-25 | End: 2022-04-25 | Stop reason: SURG

## 2022-04-25 RX ORDER — PROPOFOL 10 MG/ML
VIAL (ML) INTRAVENOUS AS NEEDED
Status: DISCONTINUED | OUTPATIENT
Start: 2022-04-25 | End: 2022-04-25 | Stop reason: SURG

## 2022-04-25 RX ORDER — SODIUM CHLORIDE, SODIUM LACTATE, POTASSIUM CHLORIDE, CALCIUM CHLORIDE 600; 310; 30; 20 MG/100ML; MG/100ML; MG/100ML; MG/100ML
30 INJECTION, SOLUTION INTRAVENOUS CONTINUOUS PRN
Status: DISCONTINUED | OUTPATIENT
Start: 2022-04-25 | End: 2022-04-25 | Stop reason: HOSPADM

## 2022-04-25 RX ADMIN — PROPOFOL 180 MCG/KG/MIN: 10 INJECTION, EMULSION INTRAVENOUS at 10:38

## 2022-04-25 RX ADMIN — LIDOCAINE HYDROCHLORIDE 60 MG: 20 INJECTION, SOLUTION INFILTRATION; PERINEURAL at 10:38

## 2022-04-25 RX ADMIN — SODIUM CHLORIDE, POTASSIUM CHLORIDE, SODIUM LACTATE AND CALCIUM CHLORIDE 30 ML/HR: 600; 310; 30; 20 INJECTION, SOLUTION INTRAVENOUS at 10:24

## 2022-04-25 RX ADMIN — PROPOFOL 140 MG: 10 INJECTION, EMULSION INTRAVENOUS at 10:38

## 2022-04-25 NOTE — BRIEF OP NOTE
ESOPHAGOGASTRODUODENOSCOPY  Progress Note    Teresita Wise  4/25/2022    Pre-op Diagnosis:   Epigastric pain [R10.13]  Heartburn [R12]  History of peptic ulcer disease [Z87.11]       Post-Op Diagnosis Codes:     * Epigastric pain [R10.13]     * Heartburn [R12]     * History of peptic ulcer disease [Z87.11]     * Gastritis [K29.70]     * Duodenitis [K29.80]    Procedure/CPT® Codes:        Procedure(s):  ESOPHAGOGASTRODUODENOSCOPY with biopsies    Surgeon(s):  Sascha Rivera MD    Anesthesia: Monitored Anesthesia Care    Staff:   Endo Technician: Monty Arce PCT  Endo Nurse: Vesna Lopez RN         Estimated Blood Loss: minimal    Urine Voided: * No values recorded between 4/25/2022 10:33 AM and 4/25/2022 10:44 AM *    Specimens:                Specimens     ID Source Type Tests Collected By Collected At Frozen?    A Gastric, Antrum Tissue · TISSUE PATHOLOGY EXAM   Sascha Rivera MD 4/25/22 1044                 Drains: * No LDAs found *    Findings: EGD with erosive duodenitis.  Antral gastritis and therefore biopsies of the antrum were obtained.  Retroflexed view the GE junction was normal as well as the esophageal mucosa.    Complications: None          Sascha Rivera MD     Date: 4/25/2022  Time: 10:45 EDT

## 2022-04-25 NOTE — H&P
[facility]  Pre Procedure History & Physical    Chief Complaint:   Epigastric pain with history of peptic ulcer disease    Subjective     HPI:   Patient 52-year-old female with history of GERD, DVT and clotting disorder presenting with epigastric pain.  Patient noted with history of peptic ulcer disease here for EGD.    Past Medical History:   Past Medical History:   Diagnosis Date   • Clotting disorder ([facility])    • Depression    • DVT (deep venous thrombosis) ([facility])     postop   • GERD (gastroesophageal reflux disease)    • H/O malignant hyperthermia     Unverified, occurred in childhood, records sought at [facility] but no longer available   • History of panic attacks    • Menometrorrhagia     Endometrial ablation followed by hysterectomy   • Urinary tract infection        Past Surgical History:  Past Surgical History:   Procedure Laterality Date   • ANKLE ARTHROSCOPY Left     Postoperative DVT, left calf   •  SECTION     •  SECTION PRIMARY      Emergency  for placental abruption   •  SECTION WITH TUBAL  1998   • CHOLECYSTECTOMY     • D & C WITH SUCTION  1996    8 weeks Postpartum subinvolution, Dr. Yue Ruth, [facility]   • D & C WITH SUCTION  1998    Blighted ovum, Dr. Yue Ruth, [facility]   • DILATATION AND CURETTAGE  1994    Dr. Yue Ruth, chronic postpartum bleeding on Depo-Provera   • HEMORRHOIDECTOMY  1998    Thrombosed hemorrhoids   • HYSTEROSCOPY ENDOMETRIAL ABLATION  2000    Sudha, Dr. Franco, [facility]   • SHOULDER ARTHROSCOPY  2015   • TOTAL ABDOMINAL HYSTERECTOMY      Menometrorrhagia, failed ablation Dr Franco, at [facility]       Family History:  Family History   Adopted: Yes   Problem Relation Age of Onset   • No Known Problems Father    • No Known Problems Mother    • No Known Problems  "Brother    • No Known Problems Sister    • No Known Problems Paternal Grandfather    • No Known Problems Paternal Grandmother    • No Known Problems Maternal Grandmother    • No Known Problems Maternal Grandfather    • No Known Problems Son    • Breast cancer Neg Hx    • Ovarian cancer Neg Hx    • Uterine cancer Neg Hx    • Colon cancer Neg Hx    • Melanoma Neg Hx        Social History:   reports that she has never smoked. She has never used smokeless tobacco. She reports that she does not drink alcohol and does not use drugs.    Medications:   Medications Prior to Admission   Medication Sig Dispense Refill Last Dose   • ascorbic acid (VITAMIN C) 250 MG tablet Take 250 mg by mouth Daily.   4/24/2022 at Unknown time   • LORazepam (ATIVAN) 1 MG tablet Take 1 mg by mouth.   Past Month at Unknown time   • melatonin 3 MG tablet Take  by mouth Daily.   4/24/2022 at Unknown time   • meloxicam (MOBIC) 15 MG tablet    Past Month at Unknown time   • metoprolol succinate XL (TOPROL-XL) 50 MG 24 hr tablet    4/24/2022 at Unknown time   • MULTIPLE VITAMIN-FOLIC ACID PO Take  by mouth.   4/24/2022 at Unknown time   • ondansetron ODT (ZOFRAN-ODT) 4 MG disintegrating tablet    Past Month at Unknown time   • SUMAtriptan (IMITREX) 100 MG tablet Take 100 mg by mouth.   Past Month at Unknown time   • traZODone (DESYREL) 50 MG tablet    Past Month at Unknown time   • Zinc 50 MG tablet Take  by mouth.   4/24/2022 at Unknown time   • estradiol (ESTRACE) 0.1 MG/GM vaginal cream Insert 1 application into the vagina.   More than a month at Unknown time   • methocarbamol (ROBAXIN) 750 MG tablet    4/11/2022   • omeprazole (priLOSEC) 40 MG capsule Take 40 mg by mouth.   4/11/2022       Allergies:  Penicillins    ROS:    Pertinent items are noted in HPI     Objective     Blood pressure 129/77, pulse 66, resp. rate 12, height 162.6 cm (64\"), weight 79.8 kg (176 lb), SpO2 99 %.    Physical Exam   Constitutional: Pt is oriented to person, place, " and time and well-developed, well-nourished, and in no distress.   Mouth/Throat: Oropharynx is clear and moist.   Neck: Normal range of motion.   Cardiovascular: Normal rate, regular rhythm and normal heart sounds.    Pulmonary/Chest: Effort normal and breath sounds normal.   Abdominal: Soft. Nontender  Skin: Skin is warm and dry.   Psychiatric: Mood, memory, affect and judgment normal.     Assessment/Plan     Diagnosis:  Epigastric pain  History of peptic ulcer disease    Anticipated Surgical Procedure:  EGD    The risks, benefits, and alternatives of this procedure have been discussed with the patient or the responsible party- the patient understands and agrees to proceed.

## 2022-04-25 NOTE — ANESTHESIA PREPROCEDURE EVALUATION
" Anesthesia Evaluation     history of anesthetic complications (h/o MH in childhood): malignant hyperthermia  NPO Solid Status: > 8 hours  NPO Liquid Status: > 2 hours           Airway   Mallampati: II  Neck ROM: full  no difficulty expected  Dental - normal exam     Pulmonary - negative pulmonary ROS and normal exam   (-) COPD, asthma, sleep apnea, not a smoker    PE comment: nonlabored  Cardiovascular - normal exam    Rhythm: regular  Rate: normal    (+) DVT,   (-) hypertension, valvular problems/murmurs, past MI, CAD, dysrhythmias, angina      Neuro/Psych  (+) psychiatric history (panic attacks) Depression,    (-) seizures, TIA, CVA  GI/Hepatic/Renal/Endo    (+) obesity,  GERD, PUD,    (-) liver disease, no renal disease, diabetes, no thyroid disorder    Musculoskeletal (-) negative ROS    Abdominal    Substance History      OB/GYN          Other   blood dyscrasia (\"clotting disorder\"),                   Anesthesia Plan    ASA 2     MAC       Anesthetic plan, all risks, benefits, and alternatives have been provided, discussed and informed consent has been obtained with: patient.        CODE STATUS:       "

## 2022-04-25 NOTE — DISCHARGE INSTRUCTIONS
For the next 24 hours patient needs to be with a responsible adult.    For 24 hours DO NOT drive, operate machinery, appliances, drink alcohol, make important decisions or sign legal documents.    Start with a light or bland diet if you are feeling sick to your stomach otherwise advance to regular diet as tolerated.    Follow recommendations on procedure report if provided by your doctor.    Call Dr Rivera for problems [phone]    Problems may include but not limited to: large amounts of bleeding, trouble breathing, repeated vomiting, severe unrelieved pain, fever or chills.

## 2022-04-25 NOTE — ANESTHESIA POSTPROCEDURE EVALUATION
"Patient: Teresita Wise    Procedure Summary     Date: 04/25/22 Room / Location:  [facility]    Anesthesia Start: 1033 Anesthesia Stop: 1050    Procedure: ESOPHAGOGASTRODUODENOSCOPY with biopsies (N/A Esophagus) Diagnosis:       Epigastric pain      Heartburn      History of peptic ulcer disease      Gastritis      Duodenitis      (Epigastric pain [R10.13])      (Heartburn [R12])      (History of peptic ulcer disease [Z87.11])    Surgeons: Sascha Rivera MD Provider: George Ordoñez MD    Anesthesia Type: MAC ASA Status: 2          Anesthesia Type: MAC    Vitals  Vitals Value Taken Time   BP 98/56 04/25/22 1050   Temp     Pulse 80 04/25/22 1050   Resp 14 04/25/22 1050   SpO2 98 % 04/25/22 1050           Post Anesthesia Care and Evaluation    Patient location during evaluation: bedside  Pain management: adequate  Airway patency: patent  Anesthetic complications: No anesthetic complications    Cardiovascular status: acceptable  Respiratory status: acceptable  Hydration status: acceptable    Comments: *BP 98/56 (BP Location: Left arm, Patient Position: Sitting)   Pulse 80   Resp 14   Ht 162.6 cm (64\")   Wt 79.8 kg (176 lb)   LMP  (LMP Unknown)   SpO2 98%   BMI 30.21 kg/m²         "

## 2022-04-26 LAB
LAB AP CASE REPORT: NORMAL
PATH REPORT.FINAL DX SPEC: NORMAL
PATH REPORT.GROSS SPEC: NORMAL

## 2022-05-11 ENCOUNTER — TELEPHONE (OUTPATIENT)
Dept: GASTROENTEROLOGY | Facility: CLINIC | Age: 52
End: 2022-05-11

## 2022-05-11 NOTE — TELEPHONE ENCOUNTER
Patient called. Advised as per Dr. Rivera's note. She states post procedure she experienced a lot of stomach pain. Reports when she  got up to go to the bathroom, she fainted on the BR floor and sustained a hematoma to her right arm   She states she called our office and someone told her to put a heating pad on her stomach.   There is no documentation she talked to someone in our office.   Patient states she will follow up with her PCP next week and will decide if she wants to move forward with any more testing. States she still has breakthrough pain. Not as bad as it once was but it still continues.   Update to Dr. Rivera and Shen Gomez, practice manager.

## 2022-05-11 NOTE — TELEPHONE ENCOUNTER
----- Message from Sascha Rivear MD sent at 5/11/2022 12:21 PM EDT -----  Biopsies normal, let us know if was still symptomatic for further work-up recommendation.

## 2023-05-15 ENCOUNTER — TELEPHONE (OUTPATIENT)
Dept: GASTROENTEROLOGY | Facility: CLINIC | Age: 53
End: 2023-05-15

## 2023-05-15 NOTE — TELEPHONE ENCOUNTER
Caller: Teresita Wise    Relationship to patient: Self    Best call back number: [phone]    Patient is needing: PATIENT CALLED IN EXPERIENCING SEVERE STOMACH PAINS IN HER UPPER ABDOMEN. SHE WANTS TO KNOW IF SHE CAN BE SEEN BY DR DE TODAY, TOMORROW OR Wednesday. SHE IS IN EXTREME PAIN, WE CAN'T SCHEDULE NEXT DAY AT THE HUB SO PATIENT WOULD LIKE A CALL PLEASE TO SEE IF YOU CAN GET HER IN.

## 2023-05-16 ENCOUNTER — OFFICE VISIT (OUTPATIENT)
Dept: GASTROENTEROLOGY | Facility: CLINIC | Age: 53
End: 2023-05-16
Payer: COMMERCIAL

## 2023-05-16 VITALS
SYSTOLIC BLOOD PRESSURE: 116 MMHG | WEIGHT: 172.2 LBS | HEIGHT: 63 IN | HEART RATE: 56 BPM | BODY MASS INDEX: 30.51 KG/M2 | DIASTOLIC BLOOD PRESSURE: 77 MMHG | TEMPERATURE: 96.9 F

## 2023-05-16 DIAGNOSIS — R10.13 EPIGASTRIC PAIN: ICD-10-CM

## 2023-05-16 DIAGNOSIS — Z87.11 HISTORY OF PEPTIC ULCER DISEASE: Primary | ICD-10-CM

## 2023-05-16 PROCEDURE — 99213 OFFICE O/P EST LOW 20 MIN: CPT | Performed by: INTERNAL MEDICINE

## 2023-05-16 RX ORDER — OMEPRAZOLE/SODIUM BICARBONATE 20MG-1.1G
1 CAPSULE ORAL NIGHTLY
Qty: 90 CAPSULE | Refills: 3 | Status: SHIPPED | OUTPATIENT
Start: 2023-05-16

## 2023-05-16 NOTE — PROGRESS NOTES
Chief Complaint   Patient presents with   • Abdominal Pain       Teresita Wise is a  53 y.o. female here for a follow up visit for peptic ulcer disease.    HPI     Patient 53-year-old female with history of IBS and GERD with recurrent epigastric pain similar to her peptic ulcer's pain she had last year.  Patient was found with duodenal erosions and PPI was changed from omeprazole to pantoprazole.  Patient reports she improved but her symptoms recurred despite ongoing therapy with pantoprazole.  Strangely the pantoprazole is not on her med list and on review it apparently was DC'd last October by the urgent care center she saw for UTI.  Not certain if she actually stopped the medication or was just removed from the list but patient reports she is still taking.    Past Medical History:   Diagnosis Date   • Clotting disorder    • Depression    • DVT (deep venous thrombosis) 2014    postop   • GERD (gastroesophageal reflux disease) 2015   • GI (gastrointestinal bleed) 2016   • H/O malignant hyperthermia 1978    Unverified, occurred in childhood, records sought at [facility] but no longer available   • History of panic attacks 2008   • Hypertension ?   • Irritable bowel syndrome ?   • Menometrorrhagia     Endometrial ablation followed by hysterectomy   • Ulcer 2016   • Urinary tract infection          Current Outpatient Medications:   •  ascorbic acid (VITAMIN C) 250 MG tablet, Take 1 tablet by mouth Daily., Disp: , Rfl:   •  CRANBERRY PO, Take  by mouth., Disp: , Rfl:   •  cyclobenzaprine (FLEXERIL) 10 MG tablet, , Disp: , Rfl:   •  HYDROcodone-acetaminophen (NORCO) 7.5-325 MG per tablet, Take 1 tablet by mouth As Needed., Disp: , Rfl:   •  LORazepam (ATIVAN) 1 MG tablet, Take 1 tablet by mouth., Disp: , Rfl:   •  melatonin 3 MG tablet, Take  by mouth Daily., Disp: , Rfl:   •  metoprolol succinate XL (TOPROL-XL) 25 MG 24 hr tablet, , Disp: , Rfl:   •  MULTIPLE VITAMIN-FOLIC ACID PO, Take  by mouth., Disp:  , Rfl:   •  Omega-3 Fatty Acids (fish oil) 1000 MG capsule capsule, Take  by mouth Daily With Breakfast., Disp: , Rfl:   •  PROBIOTIC, LACTOBACILLUS, PO, Take  by mouth., Disp: , Rfl:   •  SUMAtriptan (IMITREX) 100 MG tablet, Take 1 tablet by mouth., Disp: , Rfl:   •  vitamin B-12 (CYANOCOBALAMIN) 1000 MCG tablet, Take 1 tablet by mouth Daily., Disp: , Rfl:   •  Zinc 50 MG tablet, Take  by mouth., Disp: , Rfl:   •  fexofenadine (Allegra Allergy) 60 MG tablet, Take 1 tablet by mouth Daily for 10 days., Disp: 10 tablet, Rfl: 0  •  fluticasone (FLONASE) 50 MCG/ACT nasal spray, 2 sprays into the nostril(s) as directed by provider Daily for 30 days., Disp: 16 g, Rfl: 0  •  Omeprazole-Sodium Bicarbonate  MG capsule, Take 1 capsule by mouth Every Night., Disp: 90 capsule, Rfl: 3    Allergies   Allergen Reactions   • Penicillins Hives   • Penicillin G Hives       Social History     Socioeconomic History   • Marital status:    Tobacco Use   • Smoking status: Never   • Smokeless tobacco: Never   Vaping Use   • Vaping Use: Never used   Substance and Sexual Activity   • Alcohol use: No   • Drug use: No   • Sexual activity: Yes     Partners: Male     Birth control/protection: None, Hysterectomy       Family History   Adopted: Yes   Problem Relation Age of Onset   • No Known Problems Father    • No Known Problems Mother    • No Known Problems Brother    • No Known Problems Sister    • No Known Problems Paternal Grandfather    • No Known Problems Paternal Grandmother    • No Known Problems Maternal Grandmother    • No Known Problems Maternal Grandfather    • No Known Problems Son    • Breast cancer Neg Hx    • Ovarian cancer Neg Hx    • Uterine cancer Neg Hx    • Colon cancer Neg Hx    • Melanoma Neg Hx        Review of Systems   Constitutional: Negative.    Respiratory: Negative.    Cardiovascular: Negative.    Gastrointestinal: Positive for abdominal pain. Negative for abdominal distention, anal bleeding, blood in  stool, constipation, diarrhea, nausea, rectal pain and vomiting.   Musculoskeletal: Negative.    Skin: Negative.    Hematological: Negative.        Vitals:    05/16/23 1224   BP: 116/77   Pulse: 56   Temp: 96.9 °F (36.1 °C)       Physical Exam  Vitals reviewed.   Constitutional:       Appearance: Normal appearance. She is well-developed.   HENT:      Head: Normocephalic and atraumatic.      Mouth/Throat:      Mouth: Mucous membranes are moist.   Eyes:      General: No scleral icterus.     Pupils: Pupils are equal, round, and reactive to light.   Cardiovascular:      Rate and Rhythm: Normal rate and regular rhythm.      Heart sounds: Normal heart sounds.   Pulmonary:      Effort: Pulmonary effort is normal. No respiratory distress.      Breath sounds: Normal breath sounds. No wheezing or rales.   Abdominal:      General: Bowel sounds are normal. There is no distension.      Palpations: Abdomen is soft.      Tenderness: There is no abdominal tenderness.   Skin:     General: Skin is warm and dry.      Coloration: Skin is not jaundiced.      Findings: No rash.   Neurological:      General: No focal deficit present.      Mental Status: She is alert and oriented to person, place, and time.      Cranial Nerves: No cranial nerve deficit.   Psychiatric:         Mood and Affect: Mood normal.         Behavior: Behavior normal.         Thought Content: Thought content normal.         No visits with results within 2 Month(s) from this visit.   Latest known visit with results is:   Admission on 01/03/2023, Discharged on 01/03/2023   Component Date Value Ref Range Status   • Rapid Influenza A Ag 01/03/2023 Negative   Final-Edited   • Rapid Influenza B Ag 01/03/2023 Negative   Corrected   • Internal Control 01/03/2023 Passed   Final-Edited   • Lot Number 01/03/2023 [id number]   Final-Edited   • Expiration Date 01/03/2023 03-26-24   Final-Edited       Diagnoses and all orders for this visit:    1. History of peptic ulcer disease  (Primary)    2. Epigastric pain    Other orders  -     Omeprazole-Sodium Bicarbonate  MG capsule; Take 1 capsule by mouth Every Night.  Dispense: 90 capsule; Refill: 3      Patient 53-year-old female with history of IBS, dyspepsia and peptic ulcer disease in the past with recurrent epigastric pain.  Patient reports did improve on pantoprazole but symptoms started to worsen again.  Interestingly at last urgent care visit it appears that the nurse practitioner stopped her pantoprazole though patient says she is still taking it.  As she is already failed omeprazole and pantoprazole with breakthrough would recommend trial of Zegerid which is the only other medication patient's insurance will cover.  We will take nightly as recommended and follow clinical response.

## 2023-05-17 ENCOUNTER — TELEPHONE (OUTPATIENT)
Dept: GASTROENTEROLOGY | Facility: CLINIC | Age: 53
End: 2023-05-17
Payer: COMMERCIAL

## 2023-05-17 NOTE — TELEPHONE ENCOUNTER
Caller: Teresita Wise    Relationship: Self    Best call back number: [phone]    What was the call regarding: PT NEEDING A PRIOR AUTHORIZATION FOR Omeprazole-Sodium Bicarbonate  MG capsule [85428] (Order [id number])    Do you require a callback: YES

## 2023-05-18 NOTE — TELEPHONE ENCOUNTER
Prior authorization has been approved. Coverage Starts on: 5/17/2023 12:00:00 AM, Coverage Ends on: 5/16/2024 12:00:00 AM.    Pt pharmacy has been notified.

## 2023-05-31 ENCOUNTER — TELEPHONE (OUTPATIENT)
Dept: GASTROENTEROLOGY | Facility: CLINIC | Age: 53
End: 2023-05-31

## 2023-05-31 RX ORDER — OMEPRAZOLE/SODIUM BICARBONATE 20MG-1.1G
1 CAPSULE ORAL NIGHTLY
Qty: 90 CAPSULE | Refills: 3 | Status: SHIPPED | OUTPATIENT
Start: 2023-05-31

## 2023-05-31 NOTE — TELEPHONE ENCOUNTER
Patient called. [facility] to [facility] Advised for a 90 supply to be mailed to her home would be $14.60. Patient is agreeable and will set up an account with them. Script e-scribed to [facility].

## 2023-05-31 NOTE — TELEPHONE ENCOUNTER
----- Message from Teresita Wise sent at 5/28/2023  8:41 PM EDT -----  Regarding: Prescription   Contact: [phone]  Hi my insurance will not cover the new med that was prescribed. I have called the office and talked to ppl but I guess it never got relayed.   Teresita Wise

## 2023-09-06 ENCOUNTER — TELEPHONE (OUTPATIENT)
Dept: URGENT CARE | Facility: CLINIC | Age: 53
End: 2023-09-06
Payer: COMMERCIAL

## 2023-09-06 NOTE — TELEPHONE ENCOUNTER
Patient called states she has developed a yeast infection from her medication.  Work quested Diflucan.    Diflucan 150 mg #2 prescribed to pharmacy on record.

## 2023-11-02 ENCOUNTER — TELEPHONE (OUTPATIENT)
Dept: GASTROENTEROLOGY | Facility: CLINIC | Age: 53
End: 2023-11-02
Payer: COMMERCIAL

## 2023-11-02 ENCOUNTER — OFFICE VISIT (OUTPATIENT)
Dept: GASTROENTEROLOGY | Facility: CLINIC | Age: 53
End: 2023-11-02
Payer: COMMERCIAL

## 2023-11-02 VITALS
DIASTOLIC BLOOD PRESSURE: 82 MMHG | WEIGHT: 172 LBS | TEMPERATURE: 97.4 F | HEART RATE: 73 BPM | HEIGHT: 64 IN | BODY MASS INDEX: 29.37 KG/M2 | SYSTOLIC BLOOD PRESSURE: 116 MMHG

## 2023-11-02 DIAGNOSIS — K21.9 GASTROESOPHAGEAL REFLUX DISEASE, UNSPECIFIED WHETHER ESOPHAGITIS PRESENT: ICD-10-CM

## 2023-11-02 DIAGNOSIS — K59.00 CONSTIPATION, UNSPECIFIED CONSTIPATION TYPE: Primary | ICD-10-CM

## 2023-11-02 DIAGNOSIS — Z12.11 SCREENING FOR COLON CANCER: ICD-10-CM

## 2023-11-02 RX ORDER — PANTOPRAZOLE SODIUM 40 MG/1
40 TABLET, DELAYED RELEASE ORAL DAILY
COMMUNITY

## 2023-11-02 NOTE — TELEPHONE ENCOUNTER
----- Message from ULISES Morales sent at 11/2/2023 10:17 AM EDT -----  Can we run a screening colonoscopy on her insurance and make sure it is covered?

## 2023-11-02 NOTE — PROGRESS NOTES
Chief Complaint   Patient presents with    Constipation       HPI    Teresita Wise is a  53 y.o. female here for a follow up visit for GERD and constipation.     This patient follows with Dr. Rivera and myself.    She underwent EGD spring 2022 prompted for symptoms of abdominal pain with findings of gastritis and duodenal erosion without bleeding.    On visit today patient reports she is taking over-the-counter pantoprazole.  She has never started Zegerid.  Her primary concern on visit today is abdominal bloating, gas and constipation not reported on prior visits.  This has been an issue for at least 1 year.    She has a bowel movement every 4 days.  Denies rectal bleeding or rectal pain.  She is not taking anything over-the-counter to improve constipation.  Her appetite is good and her weight is stable.    She denies nausea, vomiting, dysphagia or odynophagia.    She reports negative Cologuard 2 to 3 years ago unavailable for review.  Family history is unknown as she is adopted.    She does report considerable psychosocial stress.    Past Medical History:   Diagnosis Date    Clotting disorder     Depression     DVT (deep venous thrombosis)     postop    GERD (gastroesophageal reflux disease)     GI (gastrointestinal bleed)     H/O malignant hyperthermia     Unverified, occurred in childhood, records sought at [facility] but no longer available    History of panic attacks 2008    Hypertension ?    Irritable bowel syndrome ?    Menometrorrhagia     Endometrial ablation followed by hysterectomy    Ulcer 2016    Urinary tract infection        Past Surgical History:   Procedure Laterality Date    ANKLE ARTHROSCOPY Left 2014    Postoperative DVT, left calf     SECTION       SECTION PRIMARY      Emergency  for placental abruption     SECTION WITH TUBAL  1998    CHOLECYSTECTOMY      COLONOSCOPY      D & C WITH SUCTION  1996    8 weeks  Postpartum subinvolution, Dr. Yue Ruth, [facility]    D & C WITH SUCTION  05/13/1998    Blighted ovum, Dr. Yue Ruth, [facility]    DILATATION AND CURETTAGE  04/19/1994    Dr. Yue Ruth, chronic postpartum bleeding on Depo-Provera    ENDOSCOPY N/A 04/25/2022    Procedure: ESOPHAGOGASTRODUODENOSCOPY with biopsies;  Surgeon: Sascha Rivera MD;  Location: [facility] ENDOSCOPY;  Service: Gastroenterology;  Laterality: N/A;  Pre: epigastric pain, h/x of ulcers  Post: errosive duodenitis, gastritis    HEMORRHOIDECTOMY  12/11/1998    Thrombosed hemorrhoids    HYSTEROSCOPY ENDOMETRIAL ABLATION  09/2000    ThermaChoickamlesh, Dr. Franco, [facility]    SHOULDER ARTHROSCOPY  09/2015    TOTAL ABDOMINAL HYSTERECTOMY  2002    Menometrorrhagia, failed ablation Dr Franco, at [facility]    UPPER GASTROINTESTINAL ENDOSCOPY  2022       Scheduled Meds:     Continuous Infusions: No current facility-administered medications for this visit.      PRN Meds:     Allergies   Allergen Reactions    Penicillins Hives    Penicillin G Hives       Social History     Socioeconomic History    Marital status:    Tobacco Use    Smoking status: Never    Smokeless tobacco: Never   Vaping Use    Vaping Use: Never used   Substance and Sexual Activity    Alcohol use: No    Drug use: No    Sexual activity: Yes     Partners: Male     Birth control/protection: None, Hysterectomy       Family History   Adopted: Yes   Problem Relation Age of Onset    No Known Problems Father     No Known Problems Mother     No Known Problems Brother     No Known Problems Sister     No Known Problems Paternal Grandfather     No Known Problems Paternal Grandmother     No Known Problems Maternal Grandmother     No Known Problems Maternal Grandfather     No Known Problems Son     Breast cancer Neg Hx     Ovarian cancer Neg Hx     Uterine cancer Neg Hx     Colon cancer Neg Hx     Melanoma Neg Hx        Review of  Systems   Gastrointestinal:  Positive for constipation.       Vitals:    11/02/23 0935   BP: 116/82   Pulse: 73   Temp: 97.4 °F (36.3 °C)       Physical Exam  Constitutional:       Appearance: She is well-developed.   Abdominal:      General: Bowel sounds are normal. There is no distension.      Palpations: Abdomen is soft. There is no mass.      Tenderness: There is no abdominal tenderness. There is no guarding.      Hernia: No hernia is present.   Skin:     General: Skin is warm and dry.      Capillary Refill: Capillary refill takes less than 2 seconds.   Neurological:      Mental Status: She is alert and oriented to person, place, and time.   Psychiatric:         Behavior: Behavior normal.     Assessment    Diagnoses and all orders for this visit:    1. Constipation, unspecified constipation type (Primary)    2. Gastroesophageal reflux disease, unspecified whether esophagitis present    3. Screening for colon cancer  -     Case Request; Standing  -     Case Request    Other orders  -     linaclotide (Linzess) 72 MCG capsule capsule; Take 1 capsule by mouth Every Morning Before Breakfast for 8 days.  Dispense: 8 capsule; Refill: 0      Plan    Regarding constipation recommend trial of low-dose Linzess to improve bowel pattern and alleviate constipation related symptoms  Patient would like to continue over-the-counter PPI therapy for now and await response to improvement in constipation  We discussed moving forward with a screening colonoscopy which she is agreeable to however she is concerned about high deductible with her insurance.  We will attempt to run out-of-pocket price for her prior to scheduling.         ULISES Morales  [facility]  [address] [place] [address]  Office: [phone]

## 2023-11-03 ENCOUNTER — TELEPHONE (OUTPATIENT)
Dept: GASTROENTEROLOGY | Facility: CLINIC | Age: 53
End: 2023-11-03

## 2023-11-03 ENCOUNTER — TELEPHONE (OUTPATIENT)
Dept: GASTROENTEROLOGY | Facility: CLINIC | Age: 53
End: 2023-11-03
Payer: COMMERCIAL

## 2023-11-03 PROBLEM — Z12.11 SCREENING FOR COLON CANCER: Status: ACTIVE | Noted: 2023-11-02

## 2023-11-03 NOTE — TELEPHONE ENCOUNTER
Hub staff attempted to follow warm transfer process and was unsuccessful     Caller: Teresita Wise    Relationship to patient: Self    Best call back number: [phone]     Patient is needing: PT CALLING TO SCHEDULE COLONOSCOPY. PT PREFERS A MON OR TUES APPT. PT IS OFF WORK THOSE DAYS. PLEASE CALL PT BACK TO SCHEDULE.

## 2023-11-03 NOTE — TELEPHONE ENCOUNTER
Hub staff attempted to follow warm transfer process and was unsuccessful     Caller: Teresita Wise    Relationship to patient: Self    Best call back number: [phone]     Patient is needing: PATIENT NEEDS TO RESCHEDULE THEIR COLONOSCOPY ON 1/22/24 PLEASE CALL BACK.

## 2023-11-06 ENCOUNTER — TELEPHONE (OUTPATIENT)
Dept: GASTROENTEROLOGY | Facility: CLINIC | Age: 53
End: 2023-11-06

## 2023-11-06 NOTE — TELEPHONE ENCOUNTER
Caller: Teresita Wise    Relationship to patient: Self    Best call back number: [phone]     Patient is needing: PATIENT STATES THAT SHE NO LONGER NEEDS TO RESCHEDULE THIS AND WANTS TO KEEP CURRENT PROCEDURE DATE. PLEASE CALL BACK IF NEEDED.

## 2023-11-06 NOTE — TELEPHONE ENCOUNTER
Caller: Teresita Wise     Relationship to patient: Self     Best call back number: [phone]      Patient is needing: PATIENT STATES THAT SHE NO LONGER NEEDS TO RESCHEDULE THIS AND WANTS TO KEEP CURRENT PROCEDURE DATE. PLEASE CALL BACK IF NEEDED.

## 2023-11-06 NOTE — TELEPHONE ENCOUNTER
Caller: Teresita Wise     Relationship to patient: Self     Best call back number: [phone]      Patient is needing: PATIENT STATES THAT SHE NO LONGER NEEDS TO RESCHEDULE THIS AND WANTS TO KEEP CURRENT PROCEDURE DATE. PLEASE CALL BACK IF NEEDED.

## 2023-11-30 ENCOUNTER — TELEPHONE (OUTPATIENT)
Dept: GASTROENTEROLOGY | Facility: CLINIC | Age: 53
End: 2023-11-30
Payer: COMMERCIAL

## 2023-11-30 NOTE — TELEPHONE ENCOUNTER
CALLER: Teresita Wise     RELATIONSHIP TO PATIENT: Self    BEST CALL BACK NUMBER: [phone]    CALL REGARDING: Is here in office to  Linzess samples that was mention on [employer] Message

## 2023-12-11 ENCOUNTER — OFFICE VISIT (OUTPATIENT)
Dept: OBSTETRICS AND GYNECOLOGY | Age: 53
End: 2023-12-11
Payer: COMMERCIAL

## 2023-12-11 VITALS
DIASTOLIC BLOOD PRESSURE: 82 MMHG | SYSTOLIC BLOOD PRESSURE: 128 MMHG | WEIGHT: 174.4 LBS | HEIGHT: 63 IN | BODY MASS INDEX: 30.9 KG/M2

## 2023-12-11 DIAGNOSIS — Z12.31 SCREENING MAMMOGRAM FOR BREAST CANCER: ICD-10-CM

## 2023-12-11 DIAGNOSIS — N39.3 STRESS INCONTINENCE IN FEMALE: ICD-10-CM

## 2023-12-11 DIAGNOSIS — N89.8 ITCHING IN THE VAGINAL AREA: ICD-10-CM

## 2023-12-11 DIAGNOSIS — Z13.89 SCREENING FOR BLOOD OR PROTEIN IN URINE: ICD-10-CM

## 2023-12-11 DIAGNOSIS — K64.0 GRADE I HEMORRHOIDS: ICD-10-CM

## 2023-12-11 DIAGNOSIS — Z01.419 WELL FEMALE EXAM WITH ROUTINE GYNECOLOGICAL EXAM: Primary | ICD-10-CM

## 2023-12-11 LAB
BILIRUB BLD-MCNC: NEGATIVE MG/DL
CLARITY, POC: CLEAR
COLOR UR: YELLOW
GLUCOSE UR STRIP-MCNC: NEGATIVE MG/DL
KETONES UR QL: NEGATIVE
LEUKOCYTE EST, POC: ABNORMAL
NITRITE UR-MCNC: NEGATIVE MG/ML
PH UR: 5 [PH] (ref 5–8)
PROT UR STRIP-MCNC: NEGATIVE MG/DL
RBC # UR STRIP: NEGATIVE /UL
SP GR UR: 1.03 (ref 1–1.03)
UROBILINOGEN UR QL: NORMAL

## 2023-12-11 NOTE — PROGRESS NOTES
"[facility]   Obstetrics and Gynecology     2023    Patient: Teresita Wise          MR#:[id number]    History of Present Illness    Chief Complaint   Patient presents with    Gynecologic Exam     Old/new gyn, annual exam, last pap 18 (-), last mammo 23 (-), urinary incontinence since having bronchitis last week and pt states she thinks she \"broke something loose\"       53 y.o. female  who presents for annual exam. 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 Patient has had a hysterectomy.  Obstetric History:  OB History          4    Para   3    Term   3            AB   1    Living   3         SAB   1    IAB        Ectopic        Molar        Multiple        Live Births   3               Menstrual History:     No LMP recorded (lmp unknown). Patient has had a hysterectomy.       Social History     Substance and Sexual Activity   Sexual Activity Yes    Partners: Male    Birth control/protection: None, Hysterectomy     ______________________________________  Patient Active Problem List   Diagnosis    Vaginal itching    Hyperhidrosis    Epigastric pain    Heartburn    History of peptic ulcer disease    Screening for colon cancer     Past Medical History:   Diagnosis Date    Anxiety     Clotting disorder     Depression     DVT (deep venous thrombosis) 2014    postop    GERD (gastroesophageal reflux disease) 2015    Gestational hypertension     GI (gastrointestinal bleed) 2016    H/O malignant hyperthermia     Unverified, occurred in childhood, records sought at [facility] but no longer available    History of panic attacks 2008    Hypertension ?    Irritable bowel syndrome ?    Kidney stone     " Menometrorrhagia     Endometrial ablation followed by hysterectomy    Multiple gestation     Recurrent pregnancy loss, antepartum condition or complication     Ulcer 2016    Urinary tract infection     Varicella      Past Surgical History:   Procedure Laterality Date    ANKLE ARTHROSCOPY Left 2014    Postoperative DVT, left calf     SECTION       SECTION PRIMARY      Emergency  for placental abruption     SECTION WITH TUBAL  1998    CHOLECYSTECTOMY  2002    COLONOSCOPY      D & C WITH SUCTION  1996    8 weeks Postpartum subinvolution, Dr. Yue Ruth, [facility]    D & C WITH SUCTION  1998    Blighted ovum, Dr. Yue Ruth, [facility]    DILATATION AND CURETTAGE  1994    Dr. Yue Ruth, chronic postpartum bleeding on Depo-Provera    ENDOSCOPY N/A 2022    Procedure: ESOPHAGOGASTRODUODENOSCOPY with biopsies;  Surgeon: Sascha Rivera MD;  Location: [facility] ENDOSCOPY;  Service: Gastroenterology;  Laterality: N/A;  Pre: epigastric pain, h/x of ulcers  Post: errosive duodenitis, gastritis    HEMORRHOIDECTOMY  1998    Thrombosed hemorrhoids    HYSTEROSCOPY ENDOMETRIAL ABLATION  2000    Dr. Joan Haley, [facility]    SHOULDER ARTHROSCOPY  2015    TOTAL ABDOMINAL HYSTERECTOMY  2002    Menometrorrhagia, failed ablation Dr Franco, at [facility]    UPPER GASTROINTESTINAL ENDOSCOPY       Social History     Tobacco Use   Smoking Status Never   Smokeless Tobacco Never     Family History   Adopted: Yes   Problem Relation Age of Onset    No Known Problems Father     No Known Problems Mother     No Known Problems Brother     No Known Problems Sister     No Known Problems Paternal Grandfather     No Known Problems Paternal Grandmother     No Known Problems Maternal Grandmother     No Known Problems Maternal Grandfather     No Known Problems Son     Breast cancer Neg Hx     Ovarian  cancer Neg Hx     Uterine cancer Neg Hx     Colon cancer Neg Hx     Melanoma Neg Hx      Prior to Admission medications    Medication Sig Start Date End Date Taking? Authorizing Provider   ascorbic acid (VITAMIN C) 250 MG tablet Take 1 tablet by mouth Daily.   Yes Ilene Dan MD   CRANBERRY PO Take  by mouth.   Yes Ilene Dan MD   cyclobenzaprine (FLEXERIL) 10 MG tablet  7/20/22  Yes Ilene Dan MD   docusate sodium (COLACE) 100 MG capsule Take 1 capsule by mouth 2 (Two) Times a Day As Needed. 8/8/23  Yes Ilene Dan MD   linaclotide (Linzess) 145 MCG capsule capsule Take 1 capsule by mouth Every Morning Before Breakfast. 11/22/23  Yes Cassidy Preciado APRN   LORazepam (ATIVAN) 1 MG tablet Take 1 tablet by mouth.   Yes Emergency, Nurse Man, RN   melatonin 3 MG tablet Take  by mouth Daily.   Yes Ilene Dan MD   methylPREDNISolone (MEDROL) 4 MG dose pack Take as directed on package instructions. 12/1/23  Yes Anna Roper APRN   metoprolol succinate XL (TOPROL-XL) 25 MG 24 hr tablet  4/23/22  Yes Emergency, Nurse Epic, RN   MULTIPLE VITAMIN-FOLIC ACID PO Take  by mouth.   Yes Emergency, Nurse Man, RN   Omega-3 Fatty Acids (fish oil) 1000 MG capsule capsule Take  by mouth Daily With Breakfast.   Yes Ilene Dan MD   pantoprazole (PROTONIX) 40 MG EC tablet Take 1 tablet by mouth Daily.   Yes ProviderIlene MD   PROBIOTIC, LACTOBACILLUS, PO Take  by mouth.   Yes Ilene Dan MD   SUMAtriptan (IMITREX) 100 MG tablet Take 1 tablet by mouth. 1/18/22  Yes Ilene Dan MD   vitamin B-12 (CYANOCOBALAMIN) 1000 MCG tablet Take 1 tablet by mouth Daily.   Yes ProviderIlene MD   Zinc 50 MG tablet Take  by mouth.   Yes Emergency, Nurse Man, RN   fexofenadine (Allegra Allergy) 60 MG tablet Take 1 tablet by mouth Daily for 10 days. 1/3/23 1/13/23  Anthony Frankel MD   fluticasone (FLONASE) 50 MCG/ACT nasal spray 2 sprays into the  "nostril(s) as directed by provider Daily for 30 days. 1/3/23 2/2/23  Anthony Frankel MD     _______________________________________    Current contraception: status post hysterectomy  History of abnormal Pap smear: no  Family history of uterine or ovarian cancer: no  Family History of colon cancer/colon polyps: no  Family History of Breast Cancer:no  History of abnormal mammogram: no  History of abnormal lipids: no    The following portions of the patient's history were reviewed and updated as appropriate: allergies, current medications, past family history, past medical history, past social history, past surgical history, and problem list.    Review of Systems    Pertinent items are noted in HPI.       Objective   Physical Exam    /82   Ht 160 cm (63\")   Wt 79.1 kg (174 lb 6.4 oz)   LMP  (LMP Unknown)   BMI 30.89 kg/m²    BP Readings from Last 3 Encounters:   12/11/23 128/82   12/01/23 137/77   11/25/23 121/82      Wt Readings from Last 3 Encounters:   12/11/23 79.1 kg (174 lb 6.4 oz)   12/01/23 77.6 kg (171 lb)   11/25/23 77.6 kg (171 lb)        BMI: Estimated body mass index is 30.89 kg/m² as calculated from the following:    Height as of this encounter: 160 cm (63\").    Weight as of this encounter: 79.1 kg (174 lb 6.4 oz).       General: alert, appears stated age, and cooperative   Heart: regular rate and rhythm, S1, S2 normal, no murmur, click, rub or gallop   Lungs: clear to auscultation bilaterally   Abdomen: soft, non-tender, without masses, no organomegaly   Breast: inspection negative, no nipple discharge or bleeding, no masses or nodularity palpable   External genitalia/Vulva: External genitalia including bartholin's glands, Urethra, Key[place]'s gland and urethra meatus are normal, Perineum, rectum and anus appear normal , and Bladder appears normal without significant prolapse scratches to rectum hemorrhoid is present    Vagina: normal mucosa, normal discharge erythema     Cervix: absent "   Uterus: absent   Adnexa: No masses palpated     As part of wellness and prevention, the following topics were discussed with the patient:  Encouraged self breast exam  Physical activity and regular exercised encouraged.         Problem List   Meds  History  Prep for Surg   Imagin}    Assessment:  Diagnoses and all orders for this visit:    1. Well female exam with routine gynecological exam (Primary)  -     Cancel: IGP, Apt HPV,rfx 16 / 18,45    2. Screening mammogram for breast cancer  -     Mammo Screening Digital Tomosynthesis Bilateral With CAD; Future    3. Screening for blood or protein in urine  -     POC Urinalysis Dipstick    4. Itching in the vaginal area  -     NuSwab VG+ - Swab, Vagina    5. Grade I hemorrhoids    6. Stress incontinence in female      Plan: pap not needed due to hysterectomy swab collected due to sx, if negative will refer to urogyn exam unremarkable discussed cutting back on caffeine going to restroom every 2 hrs and Kegel exercises UA negative All of the patient's questions were addressed and answered, I have encouraged her to call for today's test results if she has not received them within 10 days.  Patient is advised to call with any change in her condition or with any other questions, otherwise return in 12 months for annual examination. Encouraged vitamin D 800mg supplement and 1200mg calcium supplement over the counter incorporate 150 minutes of aerobic exercise weekly with strength training  Return in 1 year (on 2024) for Annual exam.    Melissa Anguiano, ULISES  2023 09:43 EST

## 2023-12-13 DIAGNOSIS — N39.3 STRESS INCONTINENCE IN FEMALE: ICD-10-CM

## 2023-12-13 DIAGNOSIS — R35.0 URINARY FREQUENCY: Primary | ICD-10-CM

## 2023-12-14 LAB
A VAGINAE DNA VAG QL NAA+PROBE: NORMAL SCORE
BVAB2 DNA VAG QL NAA+PROBE: NORMAL SCORE
C ALBICANS DNA VAG QL NAA+PROBE: NEGATIVE
C GLABRATA DNA VAG QL NAA+PROBE: NEGATIVE
C TRACH DNA VAG QL NAA+PROBE: NEGATIVE
MEGA1 DNA VAG QL NAA+PROBE: NORMAL SCORE
N GONORRHOEA DNA VAG QL NAA+PROBE: NEGATIVE
T VAGINALIS DNA VAG QL NAA+PROBE: NEGATIVE

## 2023-12-19 ENCOUNTER — PATIENT ROUNDING (BHMG ONLY) (OUTPATIENT)
Dept: OBSTETRICS AND GYNECOLOGY | Age: 53
End: 2023-12-19
Payer: COMMERCIAL

## 2024-01-22 ENCOUNTER — HOSPITAL ENCOUNTER (OUTPATIENT)
Facility: HOSPITAL | Age: 54
Setting detail: HOSPITAL OUTPATIENT SURGERY
Discharge: HOME | End: 2024-01-22
Attending: INTERNAL MEDICINE | Admitting: INTERNAL MEDICINE
Payer: COMMERCIAL

## 2024-01-22 ENCOUNTER — ANESTHESIA EVENT (OUTPATIENT)
Dept: GASTROENTEROLOGY | Facility: HOSPITAL | Age: 54
End: 2024-01-22
Payer: COMMERCIAL

## 2024-01-22 ENCOUNTER — ANESTHESIA (OUTPATIENT)
Dept: GASTROENTEROLOGY | Facility: HOSPITAL | Age: 54
End: 2024-01-22
Payer: COMMERCIAL

## 2024-01-22 VITALS
OXYGEN SATURATION: 97 % | RESPIRATION RATE: 16 BRPM | SYSTOLIC BLOOD PRESSURE: 123 MMHG | HEIGHT: 63 IN | DIASTOLIC BLOOD PRESSURE: 81 MMHG | HEART RATE: 66 BPM | WEIGHT: 169 LBS | BODY MASS INDEX: 29.95 KG/M2

## 2024-01-22 PROCEDURE — 25010000002 PROPOFOL 10 MG/ML EMULSION: Performed by: NURSE ANESTHETIST, CERTIFIED REGISTERED

## 2024-01-22 PROCEDURE — 45378 DIAGNOSTIC COLONOSCOPY: CPT | Performed by: INTERNAL MEDICINE

## 2024-01-22 PROCEDURE — 25810000003 LACTATED RINGERS PER 1000 ML: Performed by: INTERNAL MEDICINE

## 2024-01-22 PROCEDURE — S0260 H&P FOR SURGERY: HCPCS | Performed by: INTERNAL MEDICINE

## 2024-01-22 RX ORDER — LUBIPROSTONE 8 UG/1
8 CAPSULE ORAL 2 TIMES DAILY WITH MEALS
Qty: 60 CAPSULE | Refills: 11 | Status: SHIPPED | OUTPATIENT
Start: 2024-01-22

## 2024-01-22 RX ORDER — PROPOFOL 10 MG/ML
VIAL (ML) INTRAVENOUS AS NEEDED
Status: DISCONTINUED | OUTPATIENT
Start: 2024-01-22 | End: 2024-01-22 | Stop reason: SURG

## 2024-01-22 RX ORDER — SODIUM CHLORIDE, SODIUM LACTATE, POTASSIUM CHLORIDE, CALCIUM CHLORIDE 600; 310; 30; 20 MG/100ML; MG/100ML; MG/100ML; MG/100ML
1000 INJECTION, SOLUTION INTRAVENOUS CONTINUOUS
Status: DISCONTINUED | OUTPATIENT
Start: 2024-01-22 | End: 2024-01-22 | Stop reason: HOSPADM

## 2024-01-22 RX ORDER — LIDOCAINE HYDROCHLORIDE 20 MG/ML
INJECTION, SOLUTION INFILTRATION; PERINEURAL AS NEEDED
Status: DISCONTINUED | OUTPATIENT
Start: 2024-01-22 | End: 2024-01-22 | Stop reason: SURG

## 2024-01-22 RX ADMIN — SODIUM CHLORIDE, POTASSIUM CHLORIDE, SODIUM LACTATE AND CALCIUM CHLORIDE 1000 ML: 600; 310; 30; 20 INJECTION, SOLUTION INTRAVENOUS at 08:55

## 2024-01-22 RX ADMIN — PROPOFOL 180 MCG/KG/MIN: 10 INJECTION, EMULSION INTRAVENOUS at 09:24

## 2024-01-22 RX ADMIN — LIDOCAINE HYDROCHLORIDE 50 MG: 20 INJECTION, SOLUTION INFILTRATION; PERINEURAL at 09:24

## 2024-01-22 RX ADMIN — PROPOFOL 80 MG: 10 INJECTION, EMULSION INTRAVENOUS at 09:24

## 2024-01-22 NOTE — H&P
[facility]  Pre Procedure History & Physical    Chief Complaint:   Colonoscopy screening    Subjective     HPI:   Patient 54-year-old female with history of IBS, hypertension, GERD presenting for screening.  Patient with Cologuard 3 to 5 years ago that was negative.  No family history.  Patient with history of constipation, improved on Linzess but not covered by insurance.  Patient has used samples until now.  Patient here for colonoscopy.    Past Medical History:   Past Medical History:   Diagnosis Date    Anxiety     Clotting disorder     Depression     Dizziness     VESTIBULAR TESTING    DVT (deep venous thrombosis)     postop    GERD (gastroesophageal reflux disease)     Gestational hypertension     GI (gastrointestinal bleed)     H/O malignant hyperthermia     Unverified, occurred in childhood, records sought at [facility] but no longer available    History of panic attacks     Hypertension     NO MEDS    Irritable bowel syndrome     Kidney stone     Menometrorrhagia     Endometrial ablation followed by hysterectomy    Multiple gestation     Recurrent pregnancy loss, antepartum condition or complication     Ulcer 2016    Urinary tract infection     Varicella        Past Surgical History:  Past Surgical History:   Procedure Laterality Date    ANKLE ARTHROSCOPY Left     Postoperative DVT, left calf     SECTION       SECTION PRIMARY      Emergency  for placental abruption     SECTION WITH TUBAL  1998    CHOLECYSTECTOMY      COLONOSCOPY      D & C WITH SUCTION  1996    8 weeks Postpartum subinvolution, Dr. Yue Ruth, [facility]    D & C WITH SUCTION  1998    Blighted ovum, Dr. Yue Ruth, [facility]    DILATATION AND CURETTAGE  1994    Dr. Yue Ruth, chronic postpartum bleeding on Depo-Provera    ENDOSCOPY N/A 2022    Procedure:  ESOPHAGOGASTRODUODENOSCOPY with biopsies;  Surgeon: Sascha Rivera MD;  Location: [facility] ENDOSCOPY;  Service: Gastroenterology;  Laterality: N/A;  Pre: epigastric pain, h/x of ulcers  Post: errosive duodenitis, gastritis    HEMORRHOIDECTOMY  12/11/1998    Thrombosed hemorrhoids    HYSTEROSCOPY ENDOMETRIAL ABLATION  09/2000    Dr. Joan Haley, [facility]    SHOULDER ARTHROSCOPY  09/2015    TOTAL ABDOMINAL HYSTERECTOMY  2002    Menometrorrhagia, failed ablation Dr Franco, at [facility]    UPPER GASTROINTESTINAL ENDOSCOPY  2022       Family History:  Family History   Adopted: Yes   Problem Relation Age of Onset    No Known Problems Father     No Known Problems Mother     No Known Problems Brother     No Known Problems Sister     No Known Problems Paternal Grandfather     No Known Problems Paternal Grandmother     No Known Problems Maternal Grandmother     No Known Problems Maternal Grandfather     No Known Problems Son     Breast cancer Neg Hx     Ovarian cancer Neg Hx     Uterine cancer Neg Hx     Colon cancer Neg Hx     Melanoma Neg Hx        Social History:   reports that she has never smoked. She has never used smokeless tobacco. She reports that she does not drink alcohol and does not use drugs.    Medications:   Medications Prior to Admission   Medication Sig Dispense Refill Last Dose    ascorbic acid (VITAMIN C) 250 MG tablet Take 1 tablet by mouth Daily.   1/22/2024    CRANBERRY PO Take  by mouth Take As Directed.   1/22/2024    cyclobenzaprine (FLEXERIL) 10 MG tablet Take 1 tablet by mouth 3 (Three) Times a Day As Needed for Muscle Spasms.   Past Week    docusate sodium (COLACE) 100 MG capsule Take 1 capsule by mouth 2 (Two) Times a Day As Needed.   1/20/2024    fexofenadine (Allegra Allergy) 60 MG tablet Take 1 tablet by mouth Daily for 10 days. 10 tablet 0     fluticasone (FLONASE) 50 MCG/ACT nasal spray 2 sprays into the nostril(s) as directed by provider  "Daily for 30 days. 16 g 0     linaclotide (Linzess) 145 MCG capsule capsule Take 1 capsule by mouth Every Morning Before Breakfast. (Patient taking differently: Take 1 capsule by mouth Every Morning Before Breakfast. AS NEEDED) 30 capsule 4 Past Week    melatonin 3 MG tablet Take  by mouth At Night As Needed for Sleep.   Past Week    MULTIPLE VITAMIN-FOLIC ACID PO Take 1 tablet by mouth Daily.   1/22/2024    Omega-3 Fatty Acids (fish oil) 1000 MG capsule capsule Take  by mouth Daily With Breakfast.   1/22/2024    pantoprazole (PROTONIX) 40 MG EC tablet Take 1 tablet by mouth Daily.   1/20/2024    PROBIOTIC, LACTOBACILLUS, PO Take  by mouth Daily.   1/22/2024    vitamin B-12 (CYANOCOBALAMIN) 1000 MCG tablet Take 1 tablet by mouth Daily.   1/22/2024    Zinc 50 MG tablet Take  by mouth Daily.   1/22/2024    LORazepam (ATIVAN) 1 MG tablet Take 1 tablet by mouth Every 8 (Eight) Hours As Needed for Anxiety.   More than a month    SUMAtriptan (IMITREX) 100 MG tablet Take 1 tablet by mouth.   More than a month       Allergies:  Penicillins and Penicillin g    ROS:    Pertinent items are noted in HPI     Objective     Blood pressure 125/76, pulse 72, resp. rate 16, height 160 cm (63\"), weight 76.7 kg (169 lb), SpO2 96%.    Physical Exam   Constitutional: Pt is oriented to person, place, and time and well-developed, well-nourished, and in no distress.   Mouth/Throat: Oropharynx is clear and moist.   Neck: Normal range of motion.   Cardiovascular: Normal rate, regular rhythm and normal heart sounds.    Pulmonary/Chest: Effort normal and breath sounds normal.   Abdominal: Soft. Nontender  Skin: Skin is warm and dry.   Psychiatric: Mood, memory, affect and judgment normal.     Assessment & Plan     Diagnosis:  Colonoscopy screening    Anticipated Surgical Procedure:  Colonoscopy    The risks, benefits, and alternatives of this procedure have been discussed with the patient or the responsible party- the patient understands and " agrees to proceed.

## 2024-01-22 NOTE — ANESTHESIA PREPROCEDURE EVALUATION
Anesthesia Evaluation     Patient summary reviewed and Nursing notes reviewed   history of anesthetic complications:  malignant hyperthermia               Airway   Mallampati: II  TM distance: >3 FB  Neck ROM: full  Dental      Pulmonary - negative pulmonary ROS   Cardiovascular     Rhythm: regular  Rate: normal    (+) hypertension, DVT      Neuro/Psych  (+) dizziness/light headedness, psychiatric history Anxiety and Depression  GI/Hepatic/Renal/Endo    (+) GERD, PUD, GI bleeding , renal disease- stones    Musculoskeletal (-) negative ROS    Abdominal    Substance History - negative use     OB/GYN    (+) pregnancy induced hypertension        Other                      Anesthesia Plan    ASA 2     MAC     (MH history  Non-triggering anesthetic  BMI)  intravenous induction     Anesthetic plan, risks, benefits, and alternatives have been provided, discussed and informed consent has been obtained with: patient.    Plan discussed with CRNA.    CODE STATUS:

## 2024-01-22 NOTE — ANESTHESIA POSTPROCEDURE EVALUATION
Patient: Teresita Wise    Procedure Summary       Date: 01/22/24 Room / Location: [facility] ENDOSCOPY 8 / [facility] ENDOSCOPY    Anesthesia Start: 0920 Anesthesia Stop: 0948    Procedure: COLONOSCOPY to cecum and ti Diagnosis:       Screening for colon cancer      Diverticulosis      Internal hemorrhoids      (Screening for colon cancer [Z12.11])    Surgeons: Sascha Rivera MD Provider: Skinny Mcclure MD    Anesthesia Type: MAC ASA Status: 2            Anesthesia Type: MAC    Vitals  Vitals Value Taken Time   /78 01/22/24 0946   Temp     Pulse 88 01/22/24 0946   Resp 16 01/22/24 0946   SpO2 97 % 01/22/24 0946           Post Anesthesia Care and Evaluation    Patient location during evaluation: PACU  Patient participation: complete - patient participated  Level of consciousness: awake and alert  Pain management: adequate    Airway patency: patent  Anesthetic complications: No anesthetic complications    Cardiovascular status: acceptable  Respiratory status: acceptable  Hydration status: acceptable    Comments: --------------------            01/22/24 0946     --------------------   BP:       110/78     Pulse:      88       Resp:       16       SpO2:      97%      --------------------

## 2024-01-22 NOTE — BRIEF OP NOTE
COLONOSCOPY  Progress Note    Teresita Wise  1/22/2024    Pre-op Diagnosis:   Screening for colon cancer [Z12.11]       Post-Op Diagnosis Codes:     * Screening for colon cancer [Z12.11]     * Diverticulosis [K57.90]     * Internal hemorrhoids [K64.8]    Procedure/CPT® Codes:        Procedure(s):  COLONOSCOPY to cecum and ti              Surgeon(s):  Sascha Rivera MD    Anesthesia: Monitored Anesthesia Care    Staff:   Endo Technician: Cheli Carmen  Endo Nurse: Mindy Brian RN         Estimated Blood Loss: none    Urine Voided: * No values recorded between 1/22/2024  9:20 AM and 1/22/2024  9:45 AM *    Specimens:                None          Drains: * No LDAs found *    Findings: Colonoscopy the terminal ileum with normal ileal mucosa.  Sigmoid diverticulosis and internal hemorrhoids were noted but the remainder the colonic mucosa was normal.        Complications: None          Sascha Rivera MD     Date: 1/22/2024  Time: 09:51 EST

## 2024-04-12 ENCOUNTER — TELEPHONE (OUTPATIENT)
Dept: GASTROENTEROLOGY | Facility: CLINIC | Age: 54
End: 2024-04-12

## 2024-04-12 NOTE — TELEPHONE ENCOUNTER
Caller: Teresita Wise    Relationship: Self    Best call back number: [phone]    What is the best time to reach you: ANYTIME    Who are you requesting to speak with (clinical staff, provider,  specific staff member): CLINICAL    What was the call regarding: PT IS CALLING BACK TO SEE WHEN DR DE CAN CALL HER BACK. PT STATES IN THE LAST FEW HOURS HER STOMACH IS HURTING WORSE. PT STATES IT FEELS LIKE A CRAMPY ACHE. IT'S OK TO LVM.    Is it okay if the provider responds through MyChart: NO

## 2024-04-12 NOTE — TELEPHONE ENCOUNTER
Hub staff attempted to follow warm transfer process and was unsuccessful     Caller: Teresita Wise    Relationship to patient: Self    Best call back number: [phone]    Patient is needing: IS EXPERIENCING CLEAR WATERY BM'S SINCE TUESDAY W/ WHITE JELLY COMING OUT LIKE MUCUS. A LITTLE BIT OF PAIN/CRAMPING. PLEASE REVIEW AND CONTACT PATIENT TO ADVISE.

## 2024-04-12 NOTE — TELEPHONE ENCOUNTER
Called pt, she states she is having 2-3 diarrhea stools w/mucous since Tues.  C/o abd pain and cramping. Pt is no longer taking Linzess. Denies blood in stool and fever. Advised will send a msg to Dr Rivera.

## 2024-04-15 NOTE — TELEPHONE ENCOUNTER
May have acute enterocolitis, can test stool at primary or urgent care.  If she wants can send GI stool for PCR per Dr Rivera.        Called pt and advised of the above.  Pt verb understanding.  Pt reports that she is feeling much better and is no longer having any issues.

## 2024-11-12 ENCOUNTER — TELEPHONE (OUTPATIENT)
Dept: URGENT CARE | Facility: CLINIC | Age: 54
End: 2024-11-12
Payer: COMMERCIAL

## 2024-11-12 DIAGNOSIS — N30.00 ACUTE CYSTITIS WITHOUT HEMATURIA: Primary | ICD-10-CM

## 2024-11-12 RX ORDER — SULFAMETHOXAZOLE AND TRIMETHOPRIM 800; 160 MG/1; MG/1
1 TABLET ORAL 2 TIMES DAILY
Qty: 14 TABLET | Refills: 0 | Status: SHIPPED | OUTPATIENT
Start: 2024-11-12 | End: 2024-11-19

## 2024-11-12 NOTE — TELEPHONE ENCOUNTER
Called patient, she answered and confirmed her date of birth. Relayed Urine Culture bacteria resistant to Cephalexin antibiotic, recommended Bactrim antibiotic BID x7 days disp. #14 tabs 0 RF. Stop Cephalexin antibiotic. Rx sent to [facility]. Patient reports no further questions at this time, recommended she call us back if she has any. Patient expressed understanding and agreement to all of the above.     -John Cooksey, PA-C

## 2024-12-17 ENCOUNTER — TELEPHONE (OUTPATIENT)
Dept: OBSTETRICS AND GYNECOLOGY | Age: 54
End: 2024-12-17
Payer: COMMERCIAL

## 2024-12-17 NOTE — TELEPHONE ENCOUNTER
Caller: Teresita Wise    Relationship: Self    Best call back number:[phone]      Who are you requesting to speak with (clinical staff, NO KNOWN PROVIDER      What was the call regarding: PATIENT WAS RETURNING A MISSED CALL, UNSURE OF WHAT WAS NEEDED, PLEASE ASSIST.

## 2025-01-21 ENCOUNTER — OFFICE VISIT (OUTPATIENT)
Dept: GASTROENTEROLOGY | Facility: CLINIC | Age: 55
End: 2025-01-21
Payer: COMMERCIAL

## 2025-01-21 VITALS
SYSTOLIC BLOOD PRESSURE: 151 MMHG | TEMPERATURE: 98.2 F | DIASTOLIC BLOOD PRESSURE: 90 MMHG | WEIGHT: 171 LBS | HEART RATE: 72 BPM | HEIGHT: 64 IN | BODY MASS INDEX: 29.19 KG/M2

## 2025-01-21 DIAGNOSIS — K21.9 GASTROESOPHAGEAL REFLUX DISEASE, UNSPECIFIED WHETHER ESOPHAGITIS PRESENT: ICD-10-CM

## 2025-01-21 DIAGNOSIS — K59.00 CONSTIPATION, UNSPECIFIED CONSTIPATION TYPE: Primary | ICD-10-CM

## 2025-01-21 PROCEDURE — 99214 OFFICE O/P EST MOD 30 MIN: CPT | Performed by: NURSE PRACTITIONER

## 2025-01-21 RX ORDER — PANTOPRAZOLE SODIUM 40 MG/1
40 TABLET, DELAYED RELEASE ORAL DAILY
Qty: 90 TABLET | Refills: 3 | Status: SHIPPED | OUTPATIENT
Start: 2025-01-21

## 2025-01-21 NOTE — Clinical Note
Can you help me find something that is affordable for constipation.  I gave her samples of Linzess but her old insurance would not cover it.  She was not sure how much Amitiza would run and I am having a difficult time evaluating this on my end.

## 2025-01-21 NOTE — PROGRESS NOTES
Chief Complaint   Patient presents with    Constipation       HPI    Teresita Wise is a  55 y.o. female here for a follow up visit for constipation.      This is a former patient of Dr. Rivera's that is known to me.    Past medical history of anxiety, DVT, panic attacks, hypertension, kidney stones, urinary tract infections along with IBS-C.    On visit today she complains of constipation.  She has been offered Linzess in the past but it was not affordable with her prior insurance.  She was also prescribed Amitiza but never took it.  She is currently having a bowel movement once a week on average.  Associated gas, bloating and abdominal discomfort with prolonged constipation.  No rectal symptoms.    COLONOSCOPY (01/22/2024 09:11) - Diverticulosis and hemorrhoids.  Recall 10 years.    She goes on to complain of epigastric discomfort and dyspepsia. No vomiting dysphagia odynophagia.  She has been treated in the past with PPI therapy.  Her appetite is good.  Her weight is stable.    UPPER GI ENDOSCOPY (04/25/2022 10:18) - Erythematous mucosa in the antrum.  Duodenal erosions.  Use Protonix 40 mg p.o. daily indefinitely.    Past Medical History:   Diagnosis Date    Anxiety     Clotting disorder     Depression     Dizziness     VESTIBULAR TESTING    DVT (deep venous thrombosis) 2014    postop    GERD (gastroesophageal reflux disease) 2015    Gestational hypertension     GI (gastrointestinal bleed) 2016    H/O malignant hyperthermia 1978    Unverified, occurred in childhood, records sought at [facility] but no longer available    History of panic attacks 2008    Hypertension     NO MEDS    Irritable bowel syndrome     Kidney stone     Menometrorrhagia     Endometrial ablation followed by hysterectomy    Multiple gestation     Recurrent pregnancy loss, antepartum condition or complication     Ulcer 2016    Urinary tract infection     Varicella        Past Surgical History:   Procedure Laterality Date     ANKLE ARTHROSCOPY Left 2014    Postoperative DVT, left calf     SECTION  1996     SECTION PRIMARY      Emergency  for placental abruption     SECTION WITH TUBAL  1998    CHOLECYSTECTOMY      COLONOSCOPY      COLONOSCOPY N/A 2024    Procedure: COLONOSCOPY to cecum and ti;  Surgeon: Sascha Rivera MD;  Location: [facility] ENDOSCOPY;  Service: Gastroenterology;  Laterality: N/A;  pre- screening  post- hemorrhoids, diverticulosis    D & C WITH SUCTION  1996    8 weeks Postpartum subinvolution, Dr. Yue Ruth, [facility]    D & C WITH SUCTION  1998    Blighted ovum, Dr. Yue Ruth, [facility]    DILATATION AND CURETTAGE  1994    Dr. Yue Ruth, chronic postpartum bleeding on Depo-Provera    ENDOSCOPY N/A 2022    Procedure: ESOPHAGOGASTRODUODENOSCOPY with biopsies;  Surgeon: Sascha Rivera MD;  Location: [facility] ENDOSCOPY;  Service: Gastroenterology;  Laterality: N/A;  Pre: epigastric pain, h/x of ulcers  Post: errosive duodenitis, gastritis    HEMORRHOIDECTOMY  1998    Thrombosed hemorrhoids    HYSTEROSCOPY ENDOMETRIAL ABLATION  2000    [facility], [facility]    SHOULDER ARTHROSCOPY  2015    TOTAL ABDOMINAL HYSTERECTOMY      Menometrorrhagia, failed ablation Dr Franco, at [facility]    UPPER GASTROINTESTINAL ENDOSCOPY         Scheduled Meds:     Continuous Infusions: No current facility-administered medications for this visit.      PRN Meds:     Allergies   Allergen Reactions    Macrobid [Nitrofurantoin] Anaphylaxis    Penicillins Hives    Penicillin G Hives       Social History     Socioeconomic History    Marital status:    Tobacco Use    Smoking status: Never    Smokeless tobacco: Never   Vaping Use    Vaping status: Never Used   Substance and Sexual Activity    Alcohol use: No    Drug use: No    Sexual activity: Yes     Partners: Male      Birth control/protection: None, Hysterectomy       Family History   Adopted: Yes   Problem Relation Age of Onset    No Known Problems Father     No Known Problems Mother     No Known Problems Brother     No Known Problems Sister     No Known Problems Paternal Grandfather     No Known Problems Paternal Grandmother     No Known Problems Maternal Grandmother     No Known Problems Maternal Grandfather     No Known Problems Son     Breast cancer Neg Hx     Ovarian cancer Neg Hx     Uterine cancer Neg Hx     Colon cancer Neg Hx     Melanoma Neg Hx        Review of Systems   Gastrointestinal:  Positive for abdominal pain and constipation.       Vitals:    01/21/25 1431   BP: 151/90   Pulse: 72   Temp: 98.2 °F (36.8 °C)       Physical Exam  Constitutional:       Appearance: She is well-developed.   Abdominal:      General: Bowel sounds are normal. There is no distension.      Palpations: Abdomen is soft. There is no mass.      Tenderness: There is no abdominal tenderness. There is no guarding.      Hernia: No hernia is present.   Skin:     General: Skin is warm and dry.      Capillary Refill: Capillary refill takes less than 2 seconds.   Neurological:      Mental Status: She is alert and oriented to person, place, and time.   Psychiatric:         Behavior: Behavior normal.     Assessment    Diagnoses and all orders for this visit:    1. Constipation, unspecified constipation type (Primary)  -     CBC (No Diff)  -     Comprehensive Metabolic Panel  -     TSH    2. Gastroesophageal reflux disease, unspecified whether esophagitis present    Other orders  -     linaclotide (Linzess) 145 MCG capsule capsule; Take 1 capsule by mouth Every Morning Before Breakfast for 12 days.  Dispense: 12 capsule; Refill: 0  -     pantoprazole (PROTONIX) 40 MG EC tablet; Take 1 tablet by mouth Daily.  Dispense: 90 tablet; Refill: 3    Plan    Trial medium strength Linzess while we run her insurance for affordable long-term options  Samples with  dosing instructions provided  Resume Protonix 40 mg once daily in combination with antireflux diet and monitor for symptom improvement  Labs today as above  Further recommendations and follow-up pending serology and response to therapy         ULISES Morales  [facility]  [address] [place] [address]  Office: [phone]

## 2025-01-22 LAB
ALBUMIN SERPL-MCNC: 4.5 G/DL (ref 3.5–5.2)
ALBUMIN/GLOB SERPL: 1.4 G/DL
ALP SERPL-CCNC: 105 U/L (ref 39–117)
ALT SERPL-CCNC: 16 U/L (ref 1–33)
AST SERPL-CCNC: 25 U/L (ref 1–32)
BILIRUB SERPL-MCNC: 0.2 MG/DL (ref 0–1.2)
BUN SERPL-MCNC: 15 MG/DL (ref 6–20)
BUN/CREAT SERPL: 21.1 (ref 7–25)
CALCIUM SERPL-MCNC: 10.3 MG/DL (ref 8.6–10.5)
CHLORIDE SERPL-SCNC: 102 MMOL/L (ref 98–107)
CO2 SERPL-SCNC: 27.7 MMOL/L (ref 22–29)
CREAT SERPL-MCNC: 0.71 MG/DL (ref 0.57–1)
EGFRCR SERPLBLD CKD-EPI 2021: 99.9 ML/MIN/1.73
ERYTHROCYTE [DISTWIDTH] IN BLOOD BY AUTOMATED COUNT: 12.7 % (ref 12.3–15.4)
GLOBULIN SER CALC-MCNC: 3.3 GM/DL
GLUCOSE SERPL-MCNC: 83 MG/DL (ref 65–99)
HCT VFR BLD AUTO: 38.8 % (ref 34–46.6)
HGB BLD-MCNC: 13.1 G/DL (ref 12–15.9)
MCH RBC QN AUTO: 31 PG (ref 26.6–33)
MCHC RBC AUTO-ENTMCNC: 33.8 G/DL (ref 31.5–35.7)
MCV RBC AUTO: 91.7 FL (ref 79–97)
PLATELET # BLD AUTO: 273 10*3/MM3 (ref 140–450)
POTASSIUM SERPL-SCNC: 4.1 MMOL/L (ref 3.5–5.2)
PROT SERPL-MCNC: 7.8 G/DL (ref 6–8.5)
RBC # BLD AUTO: 4.23 10*6/MM3 (ref 3.77–5.28)
SODIUM SERPL-SCNC: 142 MMOL/L (ref 136–145)
TSH SERPL DL<=0.005 MIU/L-ACNC: 1.18 UIU/ML (ref 0.27–4.2)
WBC # BLD AUTO: 9.2 10*3/MM3 (ref 3.4–10.8)

## 2025-02-03 ENCOUNTER — TELEPHONE (OUTPATIENT)
Dept: GASTROENTEROLOGY | Facility: CLINIC | Age: 55
End: 2025-02-03
Payer: COMMERCIAL

## 2025-02-03 NOTE — TELEPHONE ENCOUNTER
----- Message from Cassidy Preciado sent at 1/31/2025  3:10 PM EST -----  Regarding: recs  Please contact the patient and let her know we ran her insurance for options to treat constipation.      Linzess $562.07  Amitiza $281.33  Trulance $552.27  Ibsrela not on formulary    Would she qualify for PAP for Linzess?  ----- Message -----  From: Melissa Peck  Sent: 1/22/2025  11:40 AM EST  To: ULISES Morales; #    Linzess $562.07  Amitiza $281.33  Trulance $552.27  Ibsrela not on formulary  ----- Message -----  From: Cassidy Preciado APRN  Sent: 1/21/2025   3:23 PM EST  To: Heaven Abad, PharmD    Can you help me find something that is affordable for constipation.  I gave her samples of Linzess but her old insurance would not cover it.  She was not sure how much Amitiza would run and I am having a difficult time evaluating this on my end.

## 2025-02-03 NOTE — TELEPHONE ENCOUNTER
Patient called. Advised as per Cassidy's note. Advised patient [employer] may be available for Lubiprostone. Advised to set up an account so a script can be sent to them. Patient will notify the office via my chart once she has the account set up.   Lubiprostone 8mcg 30 count is $15/month.

## 2025-06-10 ENCOUNTER — TELEPHONE (OUTPATIENT)
Dept: OBSTETRICS AND GYNECOLOGY | Age: 55
End: 2025-06-10
Payer: COMMERCIAL

## 2025-06-10 ENCOUNTER — OFFICE VISIT (OUTPATIENT)
Dept: OBSTETRICS AND GYNECOLOGY | Age: 55
End: 2025-06-10
Payer: COMMERCIAL

## 2025-06-10 VITALS
SYSTOLIC BLOOD PRESSURE: 124 MMHG | BODY MASS INDEX: 29.91 KG/M2 | HEIGHT: 63 IN | WEIGHT: 168.8 LBS | DIASTOLIC BLOOD PRESSURE: 80 MMHG

## 2025-06-10 DIAGNOSIS — Z12.31 ENCOUNTER FOR SCREENING MAMMOGRAM FOR MALIGNANT NEOPLASM OF BREAST: Primary | ICD-10-CM

## 2025-06-10 DIAGNOSIS — N95.1 VASOMOTOR SYMPTOMS DUE TO MENOPAUSE: ICD-10-CM

## 2025-06-10 DIAGNOSIS — M85.80 OSTEOPENIA, SENILE: ICD-10-CM

## 2025-06-10 RX ORDER — ESTRADIOL 0.1 MG/G
CREAM VAGINAL
Qty: 42.5 G | Refills: 12 | Status: SHIPPED | OUTPATIENT
Start: 2025-06-10

## 2025-06-10 RX ORDER — PROPRANOLOL HYDROCHLORIDE 10 MG/1
TABLET ORAL
COMMUNITY
Start: 2025-06-09

## 2025-06-10 NOTE — PROGRESS NOTES
Routine Annual Visit    6/10/2025    Patient: Teresita Wise          MR#:[id number]      Chief Complaint   Patient presents with    Gynecologic Exam     AE Today, last pap 2018 (-), HPV (-), MG 2025, Dexa 3/17/2025, Colonoscopy 2024. Pt wanting blood work checked, having difficulty sleeping        History of Present Illness    55 y.o. female  who presents for annual exam.   She has a history of hysterectomy but wonders if she is menopausal. She has only occasional hot flashes but lately difficulty sleeping. Also she has had frequent UTI this year, dryness and pain with intercourse.    No LMP recorded (lmp unknown). Patient has had a hysterectomy.  Obstetric History:  OB History          4    Para   3    Term   3            AB   1    Living   3         SAB   1    IAB        Ectopic        Molar        Multiple        Live Births   3               Menstrual History:     No LMP recorded (lmp unknown). Patient has had a hysterectomy.       ________________________________________  Patient Active Problem List   Diagnosis    Vaginal itching    Hyperhidrosis    Epigastric pain    Heartburn    History of peptic ulcer disease    Screening for colon cancer       Past Medical History:   Diagnosis Date    Anxiety     Clotting disorder     Depression     Dizziness     VESTIBULAR TESTING    DVT (deep venous thrombosis) 2014    postop    GERD (gastroesophageal reflux disease) 2015    Gestational hypertension     GI (gastrointestinal bleed) 2016    H/O malignant hyperthermia     Unverified, occurred in childhood, records sought at [facility] but no longer available    History of panic attacks 2008    Hypertension     NO MEDS    Irritable bowel syndrome     Kidney stone     Menometrorrhagia     Endometrial ablation followed by hysterectomy    Multiple gestation     Recurrent pregnancy loss, antepartum condition or complication     Ulcer 2016    Urinary tract infection      Varicella        Family History   Adopted: Yes   Problem Relation Age of Onset    No Known Problems Father     No Known Problems Mother     No Known Problems Brother     No Known Problems Sister     No Known Problems Paternal Grandfather     No Known Problems Paternal Grandmother     No Known Problems Maternal Grandmother     No Known Problems Maternal Grandfather     No Known Problems Son     Breast cancer Neg Hx     Ovarian cancer Neg Hx     Uterine cancer Neg Hx     Colon cancer Neg Hx     Melanoma Neg Hx        Past Surgical History:   Procedure Laterality Date    ANKLE ARTHROSCOPY Left 2014    Postoperative DVT, left calf     SECTION       SECTION PRIMARY      Emergency  for placental abruption     SECTION WITH TUBAL  1998    CHOLECYSTECTOMY      COLONOSCOPY      COLONOSCOPY N/A 2024    Procedure: COLONOSCOPY to cecum and ti;  Surgeon: Sascha Rivera MD;  Location: [facility] ENDOSCOPY;  Service: Gastroenterology;  Laterality: N/A;  pre- screening  post- hemorrhoids, diverticulosis    D & C WITH SUCTION  1996    8 weeks Postpartum subinvolution, Dr. Yue Ruth, [facility]    D & C WITH SUCTION  1998    Blighted ovum, Dr. Yue Ruth, [facility]    DILATATION AND CURETTAGE  1994    Dr. Yue Ruth, chronic postpartum bleeding on Depo-Provera    ENDOSCOPY N/A 2022    Procedure: ESOPHAGOGASTRODUODENOSCOPY with biopsies;  Surgeon: Sascha Rivera MD;  Location[facility] ENDOSCOPY;  Service: Gastroenterology;  Laterality: N/A;  Pre: epigastric pain, h/x of ulcers  Post: errosive duodenitis, gastritis    HEMORRHOIDECTOMY  1998    Thrombosed hemorrhoids    HYSTEROSCOPY ENDOMETRIAL ABLATION  2000    Dr. Joan Haley, [facility]    SHOULDER ARTHROSCOPY  2015    TOTAL ABDOMINAL HYSTERECTOMY  2002    Menometrorrhagia, failed ablation Dr Franco, at [facility]     "UPPER GASTROINTESTINAL ENDOSCOPY  2022       Social History     Tobacco Use   Smoking Status Never   Smokeless Tobacco Never       has a current medication list which includes the following prescription(s): acetaminophen, ascorbic acid, cranberry, cyclobenzaprine, ketorolac, lorazepam, melatonin, multivitamin, fish oil, lactobacillus, propranolol, sumatriptan, vitamin b-12, zinc, docusate sodium, estradiol, fexofenadine, fluticasone, losartan, meclizine, ondansetron odt, pantoprazole, and phenazopyridine.  ________________________________________        Objective   Physical Exam    /80   Ht 160 cm (63\")   Wt 76.6 kg (168 lb 12.8 oz)   LMP  (LMP Unknown)   BMI 29.90 kg/m²    BP Readings from Last 3 Encounters:   06/10/25 124/80   05/14/25 123/76   05/08/25 128/77      Wt Readings from Last 3 Encounters:   06/10/25 76.6 kg (168 lb 12.8 oz)   05/14/25 78.5 kg (173 lb)   05/08/25 77.6 kg (171 lb 1.2 oz)         BMI: Body mass index is 29.9 kg/m².       General:   alert, appears stated age, and cooperative   Neck: No thyromegaly or LAD   Abdomen: soft, non-tender, without masses or organomegaly   Breast: inspection negative, no nipple discharge or bleeding, no masses or nodularity palpable   Urethra and bladder: urethral meatus normal; bladder nontender to palpation;   Vulva: normal, Bartholin's, Urethra, [place]'s normal   Vagina: Normal but atrophic mucosa   Cervix: absent   Uterus: absent    Adnexa: normal adnexa and no mass, fullness, tenderness       Assessment:    normal annual exam   Vaginal atrophy  menopause    Plan:    Plan     [x]  Mammogram request made  []  PAP done  []  Labs:   []  GC/Chl/TV  []  DEXA scan   []  Referral for colonoscopy:     Plan to start  vaginal estrogen for recurrent UTI, dyspareunia  At her age she is likely menopausal but she desires to check labs  She has some osteopenia, recommend checking vitamin D  She has a hx of DVT and as having limited menopausal sx would not recommend " starting HRT    Counseling  [x] Menopause  [x]  Nutrition  [x]  Physical activity/regular exercise   [x]  Healthy weight  []  Injury prevention  []  Smoking cessation  []  Substance misuse/abuse  [x]  Sexual behavior  []  STD prevention  []  Contraception  []  Dental health  []  Mental health  []  Immunization  [x]  Encouraged SBE        Daxa Welsh MD  06/10/2025  13:15 EDT

## 2025-06-11 LAB
25(OH)D3+25(OH)D2 SERPL-MCNC: 47 NG/ML (ref 30–100)
ESTRADIOL SERPL-MCNC: <5 PG/ML
FSH SERPL-ACNC: 134 MIU/ML

## (undated) DEVICE — TUBING, SUCTION, 1/4" X 10', STRAIGHT: Brand: MEDLINE

## (undated) DEVICE — KT ORCA ORCAPOD DISP STRL

## (undated) DEVICE — LN SMPL CO2 SHTRM SD STREAM W/M LUER

## (undated) DEVICE — FRCP BX RADJAW4 NDL 2.8 240CM LG OG BX40

## (undated) DEVICE — SENSR O2 OXIMAX FNGR A/ 18IN NONSTR

## (undated) DEVICE — BITEBLOCK OMNI BLOC

## (undated) DEVICE — CANN O2 ETCO2 FITS ALL CONN CO2 SMPL A/ 7IN DISP LF

## (undated) DEVICE — ADAPT CLN BIOGUARD AIR/H2O DISP